# Patient Record
Sex: MALE | Race: WHITE | Employment: FULL TIME | URBAN - NONMETROPOLITAN AREA
[De-identification: names, ages, dates, MRNs, and addresses within clinical notes are randomized per-mention and may not be internally consistent; named-entity substitution may affect disease eponyms.]

---

## 2024-01-10 ENCOUNTER — OFFICE VISIT (OUTPATIENT)
Dept: PHYSICAL MEDICINE AND REHAB | Age: 39
End: 2024-01-10
Payer: COMMERCIAL

## 2024-01-10 VITALS
SYSTOLIC BLOOD PRESSURE: 134 MMHG | DIASTOLIC BLOOD PRESSURE: 76 MMHG | WEIGHT: 315 LBS | HEIGHT: 77 IN | BODY MASS INDEX: 37.19 KG/M2 | HEART RATE: 64 BPM

## 2024-01-10 DIAGNOSIS — G62.9 NEUROPATHY: ICD-10-CM

## 2024-01-10 DIAGNOSIS — M46.1 SI (SACROILIAC) JOINT INFLAMMATION (HCC): ICD-10-CM

## 2024-01-10 DIAGNOSIS — M54.81 OCCIPITAL NEURALGIA OF RIGHT SIDE: ICD-10-CM

## 2024-01-10 DIAGNOSIS — M47.816 LUMBAR SPONDYLOSIS: ICD-10-CM

## 2024-01-10 DIAGNOSIS — M79.7 FIBROMYALGIA: ICD-10-CM

## 2024-01-10 DIAGNOSIS — G54.0 THORACIC OUTLET SYNDROME: ICD-10-CM

## 2024-01-10 DIAGNOSIS — M79.18 MYOFASCIAL PAIN SYNDROME: ICD-10-CM

## 2024-01-10 DIAGNOSIS — G89.4 CHRONIC PAIN SYNDROME: ICD-10-CM

## 2024-01-10 DIAGNOSIS — M47.812 CERVICAL SPONDYLOSIS: Primary | ICD-10-CM

## 2024-01-10 PROCEDURE — 99244 OFF/OP CNSLTJ NEW/EST MOD 40: CPT | Performed by: NURSE PRACTITIONER

## 2024-01-10 RX ORDER — LISINOPRIL AND HYDROCHLOROTHIAZIDE 20; 12.5 MG/1; MG/1
1 TABLET ORAL DAILY
COMMUNITY

## 2024-01-10 RX ORDER — TRAZODONE HYDROCHLORIDE 150 MG/1
150 TABLET ORAL NIGHTLY
COMMUNITY

## 2024-01-10 RX ORDER — ALLOPURINOL 100 MG/1
100 TABLET ORAL DAILY
COMMUNITY

## 2024-01-10 RX ORDER — HYDROXYZINE PAMOATE 50 MG/1
50 CAPSULE ORAL 3 TIMES DAILY PRN
COMMUNITY
End: 2024-01-10

## 2024-01-10 RX ORDER — PREGABALIN 50 MG/1
50 CAPSULE ORAL 2 TIMES DAILY
Qty: 180 CAPSULE | Refills: 0 | Status: SHIPPED | OUTPATIENT
Start: 2024-01-10 | End: 2024-04-09

## 2024-01-10 RX ORDER — GABAPENTIN 600 MG/1
600 TABLET ORAL 3 TIMES DAILY
COMMUNITY

## 2024-01-10 ASSESSMENT — ENCOUNTER SYMPTOMS: BACK PAIN: 1

## 2024-01-10 NOTE — PATIENT INSTRUCTIONS
And more than one nerve may be treated.  How long do medial branch block and neurotomy take?  It takes 20 to 30 minutes to get the block. You can go home after the doctor watches you for about an hour.  It takes 45 to 90 minutes to get a neurotomy, depending on how many nerves are heated. You will probably go home 30 to 60 minutes after the procedure.  What can you expect after a neurotomy?  You will get instructions on how to report how much pain you have when you are at home.  You may feel a little sore or tender at the injection site at first. But after a successful neurotomy, most people have pain relief right away. It often lasts for several months, but your pain may come back.  If your pain does come back, it may mean that the damaged nerve has healed and can send pain messages again. Or it can mean that a different nerve is causing pain. Your doctor will discuss your options with you.  Follow-up care is a key part of your treatment and safety. Be sure to make and go to all appointments, and call your doctor if you are having problems. It's also a good idea to know your test results and keep a list of the medicines you take.  Current as of: July 11, 2023               Content Version: 13.9  © 2006-2023 [employer]   Care instructions adapted under license by [employer]. If you have questions about a medical condition or this instruction, always ask your healthcare professional. [employer] disclaims any warranty or liability for your use of this information.       Multidisciplinary Pain Management:   In the presence of complex, chronic, and multi-factorial pain, the importance of a multidisciplinary approach to pain management in the patient’s management regimen was emphasized and discussed in great detail.   PHYSICAL THERAPY: Patient is advised to see a physical therapist for gentle stretching exercises and conditioning exercises for management of pain.   PSYCHOLOGY: Patient is

## 2024-01-10 NOTE — PROGRESS NOTES
[facility]  [facility]  [address]  [place] [address]  Dept: [phone]  Dept Fax: [phone]  Loc: [phone]    Visit Date: 1/10/2024    Wyatt Gallardo is a 38 y.o. male who is referred for pain management evaluation and treatment per Dr. Jenkins.                CAGE and CAGE-AID Questions   1. In the last three months, have you felt you should cut down or stop drinking or using drugs?  Yes []        No [x]     2. In the last three months, has anyone annoyed you or gotten on your nerves by telling you to cut down or stop drinking or using drugs?  Yes []        No [x]     3. In the last three months, have you felt guilty or bad about how much you drink or use drugs?   Yes []        No [x]     4. In the last three months, have you been waking up wanting to have an alcoholic drink or use drugs?  Yes []        No [x]        Opioid Risk Tool:  Clinician Form       1. Family History of Substance Abuse: Female Male    Alcohol   []1   []3    Illegal drugs   []2   []3    Prescription drugs     []4   []4   2. Personal History of Substance Abuse:          Alcohol   []3   []3    Illegal drugs   []4   []4    Prescription drugs     []5   []5   3. Age (silas box if between 16 and 45):     []1   []1   4. History of Preadolescent Sexual Abuse:     []3   []0   5. Psychological Disease:      Attention deficit disorder, obsessive-compulsive disorder, bipolar, schizophrenia   []2   []2      Depression     []1   [x]1    Scoring Totals  1     Total Score  Low Risk  Moderate Risk  High Risk   Risk Category   0 - 3   4 - 7   8 or Above      Patient states symptoms interfere with:  A.  General Activity:  yes   B.  Mood: yes    C.  Walking Ability:   yes   D.  Normal Work (Includes both work outside the home and housework):   yes    E.  Relations with Other People:  yes   F.  Sleep:   yes   G. Enjoyment of Life:  yes       
Functionality Assessment/Goals Worksheet     On a scale of 0 (Does not Interfere) to 10 (Completely Interferes)     1.  Which number describes how during the past week pain has interfered with           the following:  A.  General Activity:  6  B.  Mood: 7  C.  Walking Ability:  6  D.  Normal Work (Includes both work outside the home and housework):  6  E.  Relations with Other People:   7  F.  Sleep:   4  G.  Enjoyment of Life:   7    2.  Patient Prefers to Take their Pain Medications:     []  On a regular basis   []  Only when necessary    []  Does not take pain medications    3.  What are the Patient's Goals/Expectations for Visiting Pain Management?     [x]  Learn about my pain    [x]  Receive Medication   [x]  Physical Therapy     [x]  Treat Depression   [x]  Receive Injections    [x]  Treat Sleep   [x]  Deal with Anxiety and Stress   []  Treat Opoid Dependence/Addiction   []  Other:                                
posteriorly relative to the glenoid.     Prior Injections:  LESI L3  Right OCNB  BELKIS  C7  Shoulder injections  TPI   BOTOX for migraines  The patient is allergic to pcn [penicillins].       Subjective:      Review of Systems   Constitutional:  Positive for activity change.   Respiratory:          YULISSA   Cardiovascular:         HTN   Gastrointestinal:         Umbilical hernia IBS Gastritis  changed to Gluten free diet  Vegan    Endocrine:        DM  2    Musculoskeletal:  Positive for arthralgias, back pain, myalgias, neck pain and neck stiffness.        GOUT   Neurological:  Positive for headaches.   Psychiatric/Behavioral:          Follows Psych for panic anxiety depression        Objective:     Vitals:    01/10/24 0938   BP: 134/76   Site: Left Upper Arm   Position: Sitting   Cuff Size: Large Adult   Pulse: 64   Weight: (!) 153.3 kg (338 lb)   Height: 1.956 m (6' 5\")       Physical Exam  Vitals and nursing note reviewed.   Constitutional:       Appearance: He is obese.   HENT:      Head: Normocephalic and atraumatic.      Right Ear: External ear normal.      Left Ear: External ear normal.      Nose: Nose normal.   Eyes:      Extraocular Movements: Extraocular movements intact.      Conjunctiva/sclera: Conjunctivae normal.      Pupils: Pupils are equal, round, and reactive to light.   Pulmonary:      Effort: Pulmonary effort is normal. No respiratory distress.   Musculoskeletal:         General: Tenderness present.      Right shoulder: Tenderness, bony tenderness and crepitus present. Decreased range of motion. Decreased strength.      Left shoulder: Tenderness and bony tenderness present. Decreased range of motion.      Cervical back: Spasms, tenderness and bony tenderness present. Decreased range of motion.      Thoracic back: Spasms, tenderness and bony tenderness present. Decreased range of motion.      Lumbar back: Spasms, tenderness and bony tenderness present. Decreased range of motion.        Back:

## 2024-01-11 ENCOUNTER — TELEPHONE (OUTPATIENT)
Dept: PHYSICAL MEDICINE AND REHAB | Age: 39
End: 2024-01-11

## 2024-01-16 ENCOUNTER — TELEPHONE (OUTPATIENT)
Dept: PHYSICAL MEDICINE AND REHAB | Age: 39
End: 2024-01-16

## 2024-01-17 NOTE — DISCHARGE INSTRUCTIONS
Signature                                                         Date/Time                                                                                                                                            Physician's or R.N.'s Signature                                                                  Date/Time      The discharge instructions have been reviewed with the patient and/or Guardian.  Patient and/or Guardian signed and retained a printed copy.

## 2024-01-25 ENCOUNTER — HOSPITAL ENCOUNTER (OUTPATIENT)
Age: 39
Setting detail: OUTPATIENT SURGERY
Discharge: HOME | End: 2024-01-25
Attending: PAIN MEDICINE | Admitting: PAIN MEDICINE
Payer: COMMERCIAL

## 2024-01-25 ENCOUNTER — APPOINTMENT (OUTPATIENT)
Dept: GENERAL RADIOLOGY | Age: 39
End: 2024-01-25
Attending: PAIN MEDICINE
Payer: COMMERCIAL

## 2024-01-25 VITALS
HEIGHT: 77 IN | TEMPERATURE: 98 F | OXYGEN SATURATION: 94 % | SYSTOLIC BLOOD PRESSURE: 135 MMHG | BODY MASS INDEX: 37.19 KG/M2 | RESPIRATION RATE: 16 BRPM | HEART RATE: 71 BPM | DIASTOLIC BLOOD PRESSURE: 80 MMHG | WEIGHT: 315 LBS

## 2024-01-25 PROBLEM — M47.812 CERVICAL SPONDYLOSIS: Status: ACTIVE | Noted: 2024-01-25

## 2024-01-25 PROCEDURE — 2500000003 HC RX 250 WO HCPCS: Performed by: PAIN MEDICINE

## 2024-01-25 PROCEDURE — 3600000056 HC PAIN LEVEL 4 BASE: Performed by: PAIN MEDICINE

## 2024-01-25 PROCEDURE — 7100000010 HC PHASE II RECOVERY - FIRST 15 MIN: Performed by: PAIN MEDICINE

## 2024-01-25 PROCEDURE — 6360000002 HC RX W HCPCS: Performed by: PAIN MEDICINE

## 2024-01-25 PROCEDURE — 64491 INJ PARAVERT F JNT C/T 2 LEV: CPT | Performed by: PAIN MEDICINE

## 2024-01-25 PROCEDURE — 3600000057 HC PAIN LEVEL 4 ADDL 15 MIN: Performed by: PAIN MEDICINE

## 2024-01-25 PROCEDURE — 2709999900 HC NON-CHARGEABLE SUPPLY: Performed by: PAIN MEDICINE

## 2024-01-25 PROCEDURE — 64490 INJ PARAVERT F JNT C/T 1 LEV: CPT | Performed by: PAIN MEDICINE

## 2024-01-25 RX ORDER — LIDOCAINE HYDROCHLORIDE 20 MG/ML
INJECTION, SOLUTION INFILTRATION; PERINEURAL PRN
Status: DISCONTINUED | OUTPATIENT
Start: 2024-01-25 | End: 2024-01-25 | Stop reason: ALTCHOICE

## 2024-01-25 RX ORDER — BUPIVACAINE HYDROCHLORIDE 5 MG/ML
INJECTION, SOLUTION PERINEURAL PRN
Status: DISCONTINUED | OUTPATIENT
Start: 2024-01-25 | End: 2024-01-25 | Stop reason: ALTCHOICE

## 2024-01-25 ASSESSMENT — PAIN - FUNCTIONAL ASSESSMENT
PAIN_FUNCTIONAL_ASSESSMENT: 0-10
PAIN_FUNCTIONAL_ASSESSMENT: NONE - DENIES PAIN

## 2024-01-25 NOTE — PROGRESS NOTES
1134: Patient to phase 2 recovery room via cart. Patient is awake and alert. Report received from surgical RN, Imani. Patient's vitals obtained, see charting.   1136: Patient is denying pain and nausea at this time.   1137: Offered drink given. Side rail down for patient to get dressed. Patient stating he understood his discharge instructions given in pre op.   1141: Patient dressed and ready to leave. Patient ambulated to the discharge lobby and discharged home in stable condition with his family.

## 2024-01-25 NOTE — OP NOTE
Then a #22-gauge 3-1/2   inch spinal needle was introduced through the skin wheal under fluoroscopy guidance such that the tip of the needle lies at the junction of the transverse process C4/5 and C5/6  with the superior processes of the facet joint.   Then after negative aspiration a total of 6 ml of 0.5% Marcaine and was injected through the needle in divided doses   EBL-0  Patient's vital signs and neurological status remained stable through  the procedure and the post procedural  period.  The patient was instructed to keep track of pain for next 24 hours every hour and bring it to the next visit.  Patient tolerated the procedure well and was discharged home in stable condition.    Electronically signed by Rajan Leonard MD on 1/25/24 at 11:21 AM EST

## 2024-01-25 NOTE — H&P
present. Decreased range of motion.      Left hip: Tenderness, bony tenderness and crepitus present. Decreased range of motion.      Right knee: Bony tenderness present. Tenderness present.      Left knee: Bony tenderness and crepitus present. Decreased range of motion. Tenderness present.   Skin:     General: Skin is warm and dry.   Neurological:      General: No focal deficit present.      Mental Status: He is alert and oriented to person, place, and time.   Psychiatric:         Mood and Affect: Mood normal.         Behavior: Behavior normal.         Thought Content: Thought content normal.         Judgment: Judgment normal.         FRANK  Patricks test  positive  Yeoman's or Gaenslen's  positive  Kemps  positive  Spurlings  positive           Assessment:      1. Cervical spondylosis    2. SI (sacroiliac) joint inflammation (HCC)    3. Lumbar spondylosis    4. Myofascial pain syndrome    5. Chronic pain syndrome    6. Occipital neuralgia of right side    7. Fibromyalgia    8. Thoracic outlet syndrome    9. Neuropathy             ..There is no problem list on file for this patient.     Plan:      Testing, Labs or Radiology Reviewed: Cervical shoulder Thoracic  Procedures with Fluoroscopy: Cervical Facet MBB #1 @ C4-5,5-6 Bilateral   Discussed with patient about risks with procedure including infection, reaction to medication, increased pain, or bleeding.  Medications:I will take over Trazodone but lower to 25 mg . Continue Cymbalta 60   Lower Neurontin to 600 mg at HS only at 8 pm   Trial Linnette 50 mg BID 6-8 am then 2-4 pm.  STOP HYDROXYZINE   If patient is on blood thinners will need approval to hold: yes or no:  Does patient have implanted device Stimulator, AICD or Pacemaker etc?   Referral to Dr Luke to rule out Thoracic Outlet Syndrome               Return for Cervical Facet MBB @C4-5, 5-6 Bilateral #1, Follow up after procedure.

## 2024-02-13 ENCOUNTER — OFFICE VISIT (OUTPATIENT)
Dept: PHYSICAL MEDICINE AND REHAB | Age: 39
End: 2024-02-13
Payer: COMMERCIAL

## 2024-02-13 VITALS
BODY MASS INDEX: 37.19 KG/M2 | HEIGHT: 77 IN | DIASTOLIC BLOOD PRESSURE: 78 MMHG | HEART RATE: 66 BPM | WEIGHT: 315 LBS | SYSTOLIC BLOOD PRESSURE: 138 MMHG

## 2024-02-13 DIAGNOSIS — M25.551 BILATERAL HIP PAIN: ICD-10-CM

## 2024-02-13 DIAGNOSIS — M54.81 OCCIPITAL NEURALGIA OF RIGHT SIDE: ICD-10-CM

## 2024-02-13 DIAGNOSIS — M79.7 FIBROMYALGIA: ICD-10-CM

## 2024-02-13 DIAGNOSIS — M79.18 MYOFASCIAL PAIN SYNDROME: ICD-10-CM

## 2024-02-13 DIAGNOSIS — M47.812 CERVICAL SPONDYLOSIS: Primary | ICD-10-CM

## 2024-02-13 DIAGNOSIS — M47.816 LUMBAR SPONDYLOSIS: ICD-10-CM

## 2024-02-13 DIAGNOSIS — M25.552 BILATERAL HIP PAIN: ICD-10-CM

## 2024-02-13 DIAGNOSIS — G89.4 CHRONIC PAIN SYNDROME: ICD-10-CM

## 2024-02-13 DIAGNOSIS — M46.1 SI (SACROILIAC) JOINT INFLAMMATION (HCC): ICD-10-CM

## 2024-02-13 DIAGNOSIS — G62.9 NEUROPATHY: ICD-10-CM

## 2024-02-13 PROCEDURE — 99214 OFFICE O/P EST MOD 30 MIN: CPT | Performed by: NURSE PRACTITIONER

## 2024-02-13 PROCEDURE — G8484 FLU IMMUNIZE NO ADMIN: HCPCS | Performed by: NURSE PRACTITIONER

## 2024-02-13 PROCEDURE — 1036F TOBACCO NON-USER: CPT | Performed by: NURSE PRACTITIONER

## 2024-02-13 PROCEDURE — 20553 NJX 1/MLT TRIGGER POINTS 3/>: CPT | Performed by: NURSE PRACTITIONER

## 2024-02-13 PROCEDURE — G8417 CALC BMI ABV UP PARAM F/U: HCPCS | Performed by: NURSE PRACTITIONER

## 2024-02-13 PROCEDURE — G8427 DOCREV CUR MEDS BY ELIG CLIN: HCPCS | Performed by: NURSE PRACTITIONER

## 2024-02-13 RX ORDER — TRIAMCINOLONE ACETONIDE 40 MG/ML
80 INJECTION, SUSPENSION INTRA-ARTICULAR; INTRAMUSCULAR ONCE
Status: DISCONTINUED | OUTPATIENT
Start: 2024-02-13 | End: 2024-02-13

## 2024-02-13 RX ORDER — TRIAMCINOLONE ACETONIDE 40 MG/ML
40 INJECTION, SUSPENSION INTRA-ARTICULAR; INTRAMUSCULAR ONCE
Status: COMPLETED | OUTPATIENT
Start: 2024-02-13 | End: 2024-02-13

## 2024-02-13 RX ADMIN — TRIAMCINOLONE ACETONIDE 40 MG: 40 INJECTION, SUSPENSION INTRA-ARTICULAR; INTRAMUSCULAR at 13:39

## 2024-02-13 NOTE — PROGRESS NOTES
HPI:     ChiefComplaint: Lumbar back pain, cervical pain , SI pain, and Fibromyalgia Myofascial       F/U  Cervical Facet MBB #1 @ C4-5,5-6 Bilateral  1/25/2024  states he received about 80% pain relief or benefit for 2-3 days. He had less headaches more ROM less pain more feeling in his cervical incision which has been numb since surgery. It was more hypersensitive .   Here with mother. The cervical pain and headaches have returned.   He is having some right sided mostly  thoracic pain  under scapular area mostly.   He has been having increase hip pain  mostly when goes to stand. He has been having Fibromyalgia flares also. He continues water therapy. States helps him the most.   Lyrica trial is helping his abdomen or torso nerve pain.   He feels his Fibromyalgia nerve pain is his main problem form hips waste down to thighs. Sometimes he can't walk.  Stress increases his Fibromyalgia.  He has made multiple lifestyle dietary changes     He is pending  disability claim appeals.      Follows Psych  for  MDD seen 1/17/2024 he has been having increased anxiety and brain fog.     I referred to Dr Luke to rule out Thoracic Outlet Syndrome see results below   We will order more PT OT              HPI  New pt here with mom, transferring care from [facility] due to lapse in insurance and then tired of driving  that far. He was going to  [facility] for past 3 years. He wasn't very happy with  the treatment plan.  He has had several injections some with relief some without. He most recently stopped his Neurontin due to Dr not refilling and states he felt more clearer minded  not on the medication but was in a  lot more pain. Then was refilled and now taking.   He currently takes Neurontin  Cymbalta Diclofenac Trazodone Hydroxyzine. He feels like he is weak and tired all the time. With Brain fogTylenol Medical THC for past 2 years.  He has MAXALT in past has used 3-4 times states works but

## 2024-02-13 NOTE — PATIENT INSTRUCTIONS
It's also a good idea to know your test results and keep a list of the medicines you take.  Current as of: July 11, 2023               Content Version: 13.9  © 2006-2023 [employer].   Care instructions adapted under license by [employer]. If you have questions about a medical condition or this instruction, always ask your healthcare professional. [employer] disclaims any warranty or liability for your use of this information.       Multidisciplinary Pain Management:   In the presence of complex, chronic, and multi-factorial pain, the importance of a multidisciplinary approach to pain management in the patient’s management regimen was emphasized and discussed in great detail.   PHYSICAL THERAPY: Patient is advised to see a physical therapist for gentle stretching exercises and conditioning exercises for management of pain.   PSYCHOLOGY: Patient is advised to see a clinical pain psychologist, for the psychosocial aspect of pain care through coping skills, relaxation strategies, cognitive group therapy etc.   OBESITY: Patient is advised to seek nutrition consult to help in managing their weight to decrease its impact on pain.   The patient was also advised to continue physical therapy and stretching exercises at home and cognitive behavioral and/or group therapy.

## 2024-02-13 NOTE — PROGRESS NOTES
Functionality Assessment/Goals Worksheet     On a scale of 0 (Does not Interfere) to 10 (Completely Interferes)     1.  Which number describes how during the past week pain has interfered with           the following:  A.  General Activity:  5  B.  Mood: 8  C.  Walking Ability:  0  D.  Normal Work (Includes both work outside the home and housework):  7  E.  Relations with Other People:   8  F.  Sleep:   8  G.  Enjoyment of Life:   8    2.  Patient Prefers to Take their Pain Medications:     [x]  On a regular basis   []  Only when necessary    []  Does not take pain medications    3.  What are the Patient's Goals/Expectations for Visiting Pain Management?     [x]  Learn about my pain    [x]  Receive Medication   [x]  Physical Therapy     [x]  Treat Depression   [x]  Receive Injections    [x]  Treat Sleep   [x]  Deal with Anxiety and Stress   []  Treat Opoid Dependence/Addiction   []  Other:

## 2024-02-14 ENCOUNTER — TELEPHONE (OUTPATIENT)
Dept: PHYSICAL MEDICINE AND REHAB | Age: 39
End: 2024-02-14

## 2024-02-23 ENCOUNTER — HOSPITAL ENCOUNTER (OUTPATIENT)
Dept: GENERAL RADIOLOGY | Age: 39
Discharge: HOME | End: 2024-02-23
Payer: COMMERCIAL

## 2024-02-23 ENCOUNTER — HOSPITAL ENCOUNTER (OUTPATIENT)
Age: 39
Discharge: HOME | End: 2024-02-23
Payer: COMMERCIAL

## 2024-02-23 DIAGNOSIS — M25.551 BILATERAL HIP PAIN: ICD-10-CM

## 2024-02-23 DIAGNOSIS — M46.1 SI (SACROILIAC) JOINT INFLAMMATION (HCC): ICD-10-CM

## 2024-02-23 DIAGNOSIS — M25.552 BILATERAL HIP PAIN: ICD-10-CM

## 2024-02-23 PROCEDURE — 73522 X-RAY EXAM HIPS BI 3-4 VIEWS: CPT

## 2024-03-01 NOTE — DISCHARGE INSTRUCTIONS
ANESTHESIA INSTRUCTIONS FOLLOWING SURGERY        Since you may experience some intermittent light-headedness for the next several hours, we suggest you plan on bed rest or quiet relaxation this evening. You must have a friend or relative stay with you tonight.    Because of the sedation you have received, it is recommended that you do not drive a motor vehicle, operate any kind of machinery, or sign any contractual agreement for 24 hours following the procedure.    You should not take alcoholic beverages tonight and only take sleeping medication that has been specifically prescribed for you by your physician.  Call office [phone] if you have:  Temperature greater than 100.4  Persistent nausea and vomiting  Severe uncontrolled pain  Redness, tenderness, or signs of infection (pain, swelling, redness, odor or green/yellow discharge around the site)  Difficulty breathing, headache or visual disturbances  Hives  Persistent dizziness or light-headedness  Extreme fatigue  Any other questions or concerns you may have after discharge    In an emergency, call 911 or go to an Emergency Department at a nearby hospital    It is important to bring a complete, current list of your medications to any medical appointments or hospitalizations.    REMINDER:   Carry a list of your medications and allergies with you at all times  Call your pharmacy at least 1 week in advance to refill prescriptions    Diet: Resume your usual diet. Good nutrition promotes healing. Increase fluid intake.     Activity: Rest for 24 hrs then resume normal activity.    HOME MEDICATIONS:      If on blood thinning products such as; Aspirin, NSAIDS, Plavix, Coumadin, Xarelto, Fish Oil, Multi-Vitamins or Herbal Supplements restart in 24 hours      Restart Metformin in 48 hours if you had procedure with dye.      Restart Metformin in 24 hours if no dye used during procedure.        Education Materials Received: {yes/no:014369}  Belongings Returned:

## 2024-03-06 ENCOUNTER — HOSPITAL ENCOUNTER (OUTPATIENT)
Age: 39
Setting detail: OUTPATIENT SURGERY
Discharge: HOME | End: 2024-03-06
Attending: PAIN MEDICINE | Admitting: PAIN MEDICINE
Payer: COMMERCIAL

## 2024-03-06 ENCOUNTER — APPOINTMENT (OUTPATIENT)
Dept: GENERAL RADIOLOGY | Age: 39
End: 2024-03-06
Attending: PAIN MEDICINE
Payer: COMMERCIAL

## 2024-03-06 VITALS
OXYGEN SATURATION: 98 % | WEIGHT: 315 LBS | BODY MASS INDEX: 37.19 KG/M2 | RESPIRATION RATE: 16 BRPM | SYSTOLIC BLOOD PRESSURE: 139 MMHG | TEMPERATURE: 97.1 F | HEART RATE: 65 BPM | HEIGHT: 77 IN | DIASTOLIC BLOOD PRESSURE: 89 MMHG

## 2024-03-06 PROCEDURE — 64491 INJ PARAVERT F JNT C/T 2 LEV: CPT | Performed by: PAIN MEDICINE

## 2024-03-06 PROCEDURE — 3600000057 HC PAIN LEVEL 4 ADDL 15 MIN: Performed by: PAIN MEDICINE

## 2024-03-06 PROCEDURE — 2500000003 HC RX 250 WO HCPCS: Performed by: PAIN MEDICINE

## 2024-03-06 PROCEDURE — 3600000056 HC PAIN LEVEL 4 BASE: Performed by: PAIN MEDICINE

## 2024-03-06 PROCEDURE — 7100000010 HC PHASE II RECOVERY - FIRST 15 MIN: Performed by: PAIN MEDICINE

## 2024-03-06 PROCEDURE — 99152 MOD SED SAME PHYS/QHP 5/>YRS: CPT | Performed by: PAIN MEDICINE

## 2024-03-06 PROCEDURE — 64490 INJ PARAVERT F JNT C/T 1 LEV: CPT | Performed by: PAIN MEDICINE

## 2024-03-06 PROCEDURE — 6360000002 HC RX W HCPCS: Performed by: PAIN MEDICINE

## 2024-03-06 PROCEDURE — 2709999900 HC NON-CHARGEABLE SUPPLY: Performed by: PAIN MEDICINE

## 2024-03-06 RX ORDER — PREDNISONE 1 MG/1
TABLET ORAL DAILY
COMMUNITY

## 2024-03-06 RX ORDER — BUPIVACAINE HYDROCHLORIDE 5 MG/ML
INJECTION, SOLUTION PERINEURAL PRN
Status: DISCONTINUED | OUTPATIENT
Start: 2024-03-06 | End: 2024-03-06 | Stop reason: ALTCHOICE

## 2024-03-06 RX ORDER — LIDOCAINE HYDROCHLORIDE 20 MG/ML
INJECTION, SOLUTION INFILTRATION; PERINEURAL PRN
Status: DISCONTINUED | OUTPATIENT
Start: 2024-03-06 | End: 2024-03-06 | Stop reason: ALTCHOICE

## 2024-03-06 ASSESSMENT — PAIN - FUNCTIONAL ASSESSMENT
PAIN_FUNCTIONAL_ASSESSMENT: 0-10
PAIN_FUNCTIONAL_ASSESSMENT: 0-10

## 2024-03-06 NOTE — PRE SEDATION
[facility]  Pre-Sedation/Analgesia History & Physical    Pt Name: Wyatt Gallardo  MRN: [id number]  YOB: 1985  Provider Performing Procedure: Rajan Leonard MD  Primary Care Physician: Eran Jenkins MD    PRE-PROCEDURE   DNR-CCA/DNR-CC : No  Brief History/Pre-Procedure Diagnosis:     Patient had  Cervical Facet MBB #1 @ C4-5,5-6 Bilateral  1/25/2024  states he received  80% pain relief or benefit for 2-3 days. He had less headaches more ROM less pain more feeling in his cervical incision which has been numb since surgery. It was more hypersensitive .   Here with mother. The cervical pain and headaches have returned.   He is having some right sided mostly  thoracic pain  under scapular area mostly.   He has been having increase hip pain  mostly when goes to stand. He has been having Fibromyalgia flares also. He continues water therapy. States helps him the most.   Lyrica trial is helping his abdomen or torso nerve pain.   He feels his Fibromyalgia nerve pain is his main problem form hips waste down to thighs. Sometimes he can't walk.  Stress increases his Fibromyalgia.  He has made multiple lifestyle dietary changes      He is pending  disability claim appeals.       Follows Psych  for  MDD seen 1/17/2024 he has been having increased anxiety and brain fog.      I referred to Dr Luke to rule out Thoracic Outlet Syndrome see results below   We will order more PT OT                  HPI  New pt here with mom, transferring care from [facility] due to lapse in insurance and then tired of driving  that far. He was going to  [facility] for past 3 years. He wasn't very happy with  the treatment plan.  He has had several injections some with relief some without. He most recently stopped his Neurontin due to Dr not refilling and states he felt more clearer minded  not on the medication but was in a  lot more pain. Then was refilled and now taking.

## 2024-03-06 NOTE — OP NOTE
Pre-Procedure Note    Patient Name: Wyatt Gallardo   YOB: 1985  Medical Record Number: [id number]  Date: 3/6/24    Indication:  Neck pain    Consent: On file.    Vital Signs:   Vitals:    03/06/24 1111   BP: 134/88   Pulse: 65   Resp: 16   Temp: 97.4 °F (36.3 °C)   SpO2: 97%       Past Medical History:   has a past medical history of Diabetes mellitus (HCC), Hypertension, and Insomnia.    Past Surgical History:   has a past surgical history that includes FL SHOULDER ARTHROGRAM RIGHT S&I (Left, 2005); Carpal tunnel release (Bilateral, 2020); Cervical disc surgery (2021); Tonsillectomy and adenoidectomy (1988); and Facet joint injection (Bilateral, 1/25/2024).    Pre-Sedation Documentation and Exam:   Vital signs have been reviewed (see flow sheet for vitals).     Sedation/ Anesthesia Plan:   LOCAL      Patient is an appropriate candidate for plan of sedation: yes      Preoperative Diagnosis: C-spondylosis     Post-Op Dx: as above     Procedure Performed : Diagnostic / Confirmatory Median Branch Blocks at the levels of C4-5 and C5-6 bilateral under fluoroscopic guidance without IV sedation. # 1.     Indication for the Procedure:  Patient had history of chronic neck pain that is not responding well to the conservative treatment.  Patient's pain is mostly axial in nature.  Pain is interfering with the activities of daily living.  Examination revealed facet tenderness and facet loading is positive.   Hence we decided to do confirmatory median branch blocks for possible radiofrequency abalation of median branches for long term pain releif.   The procedure and risks  were discussed with the patient and an informed consent was obtained.     Procedure:    Patient is placed in prone position and skin over the back was prepped and draped in sterile manner. Then using fluoroscopy the waist of the facet joint complex of the vertebra was observed and the view  was optimized. The skin and deep tissues over the area

## 2024-03-06 NOTE — H&P
reviewed.   Constitutional:       Appearance: He is obese.   HENT:      Head: Normocephalic and atraumatic.      Right Ear: External ear normal.      Left Ear: External ear normal.      Nose: Nose normal.   Eyes:      Extraocular Movements: Extraocular movements intact.      Conjunctiva/sclera: Conjunctivae normal.      Pupils: Pupils are equal, round, and reactive to light.   Pulmonary:      Effort: Pulmonary effort is normal. No respiratory distress.   Musculoskeletal:         General: Tenderness present.      Right shoulder: Tenderness, bony tenderness and crepitus present. Decreased range of motion. Decreased strength.      Left shoulder: Tenderness and bony tenderness present. Decreased range of motion.      Cervical back: Spasms, tenderness and bony tenderness present. Decreased range of motion.      Thoracic back: Spasms, tenderness and bony tenderness present. Decreased range of motion.      Lumbar back: Spasms, tenderness and bony tenderness present. Decreased range of motion.        Back:       Right hip: Tenderness, bony tenderness and crepitus present. Decreased range of motion.      Left hip: Tenderness, bony tenderness and crepitus present. Decreased range of motion.      Right knee: Bony tenderness present. Tenderness present.      Left knee: Bony tenderness and crepitus present. Decreased range of motion. Tenderness present.   Skin:     General: Skin is warm and dry.   Neurological:      General: No focal deficit present.      Mental Status: He is alert and oriented to person, place, and time.   Psychiatric:         Mood and Affect: Mood normal.         Behavior: Behavior normal.         Thought Content: Thought content normal.         Judgment: Judgment normal.         FRANK  Patricks test  positive  Yeoman's or Gaenslen's  positive  Kemps  positive  Spurlings  positive           Assessment:      1. Cervical spondylosis    2. Lumbar spondylosis    3. Fibromyalgia    4. SI (sacroiliac) joint

## 2024-03-06 NOTE — PROGRESS NOTES
1217 Patient to phase 2 from surgery, report from Linda VARELA. Vitals stable on room air. Injection site with no complications. Patient alert, oriented, appropriate. Patient states pain is improved from when he came in. Call light in reach.  1220 Patient getting dressed.   1230 Patient walked to discharge doors in stable condition. Discharged with AVS and all belongings.

## 2024-03-20 ENCOUNTER — TELEPHONE (OUTPATIENT)
Dept: PHYSICAL MEDICINE AND REHAB | Age: 39
End: 2024-03-20

## 2024-03-20 ENCOUNTER — OFFICE VISIT (OUTPATIENT)
Dept: PHYSICAL MEDICINE AND REHAB | Age: 39
End: 2024-03-20
Payer: COMMERCIAL

## 2024-03-20 VITALS
SYSTOLIC BLOOD PRESSURE: 136 MMHG | BODY MASS INDEX: 37.19 KG/M2 | HEART RATE: 76 BPM | DIASTOLIC BLOOD PRESSURE: 82 MMHG | HEIGHT: 77 IN | WEIGHT: 315 LBS

## 2024-03-20 DIAGNOSIS — M46.1 SI (SACROILIAC) JOINT INFLAMMATION (HCC): ICD-10-CM

## 2024-03-20 DIAGNOSIS — G89.4 CHRONIC PAIN SYNDROME: ICD-10-CM

## 2024-03-20 DIAGNOSIS — M47.812 CERVICAL SPONDYLOSIS: Primary | ICD-10-CM

## 2024-03-20 DIAGNOSIS — M47.816 LUMBAR SPONDYLOSIS: ICD-10-CM

## 2024-03-20 DIAGNOSIS — G62.9 NEUROPATHY: ICD-10-CM

## 2024-03-20 DIAGNOSIS — M79.7 FIBROMYALGIA: ICD-10-CM

## 2024-03-20 DIAGNOSIS — M79.18 MYOFASCIAL PAIN SYNDROME: ICD-10-CM

## 2024-03-20 PROCEDURE — G8484 FLU IMMUNIZE NO ADMIN: HCPCS | Performed by: NURSE PRACTITIONER

## 2024-03-20 PROCEDURE — 99215 OFFICE O/P EST HI 40 MIN: CPT | Performed by: NURSE PRACTITIONER

## 2024-03-20 PROCEDURE — 1036F TOBACCO NON-USER: CPT | Performed by: NURSE PRACTITIONER

## 2024-03-20 PROCEDURE — G8417 CALC BMI ABV UP PARAM F/U: HCPCS | Performed by: NURSE PRACTITIONER

## 2024-03-20 PROCEDURE — G8427 DOCREV CUR MEDS BY ELIG CLIN: HCPCS | Performed by: NURSE PRACTITIONER

## 2024-03-20 RX ORDER — PREGABALIN 50 MG/1
50 CAPSULE ORAL 3 TIMES DAILY
Qty: 270 CAPSULE | Refills: 0 | Status: SHIPPED | OUTPATIENT
Start: 2024-03-20 | End: 2024-06-18

## 2024-03-20 ASSESSMENT — ENCOUNTER SYMPTOMS: BACK PAIN: 1

## 2024-03-20 NOTE — PROGRESS NOTES
Functionality Assessment/Goals Worksheet     On a scale of 0 (Does not Interfere) to 10 (Completely Interferes)     1.  Which number describes how during the past week pain has interfered with           the following:  A.  General Activity:  6  B.  Mood: 7  C.  Walking Ability:  0  D.  Normal Work (Includes both work outside the home and housework):  7  E.  Relations with Other People:   5  F.  Sleep:   8  G.  Enjoyment of Life:   5    2.  Patient Prefers to Take their Pain Medications:     []  On a regular basis   []  Only when necessary    []  Does not take pain medications    3.  What are the Patient's Goals/Expectations for Visiting Pain Management?     [x]  Learn about my pain    [x]  Receive Medication   []  Physical Therapy     [x]  Treat Depression   []  Receive Injections    [x]  Treat Sleep   [x]  Deal with Anxiety and Stress   []  Treat Opoid Dependence/Addiction   []  Other:                                
rule out Thoracic Outlet Syndrome   Reviewed Hip pelvis XR  ordered  FCE ordered pending  March 27,2024     Meds. Prescribed:   Orders Placed This Encounter   Medications    pregabalin (LYRICA) 50 MG capsule     Sig: Take 1 capsule by mouth 3 times daily for 90 days. Max Daily Amount: 150 mg     Dispense:  270 capsule     Refill:  0       Return for  Cervical  RFA @ C4-5,5-6 Bilateral with IV sedation under Fluorsoscopy .         Electronically signed by SHILPA Stovall CNP on 3/20/2024 at 9:00 AM

## 2024-03-20 NOTE — PATIENT INSTRUCTIONS
Patient Education        Learning About Medial Branch Block and Neurotomy  What are medial branch block and neurotomy?     Facet joints connect your vertebrae to each other. Problems in these joints can cause chronic (long-term) pain in the neck or back.  Medial branch nerves are the nerves that carry many of the pain messages from your facet joints.  Radiofrequency medial branch neurotomy is a type of medial branch neurotomy that is used to relieve arthritis pain. It uses radio waves to damage nerves in your neck or back so that they can no longer send pain messages to your brain.  Before your doctor knows if a neurotomy will help you, you will get a medial branch block to find out if certain nerves are the ones that are a source of your pain. You will need two separate visits to the outpatient center or hospital to have both procedures.  You will need someone to drive you home.  How is a medial branch block done?  The doctor will use a tiny needle to numb the skin where you will get the block. Then the doctor puts the block needle into the numbed area. You may feel some pressure, but you should not feel pain. Using fluoroscopy (live X-ray) to guide the needle, the doctor injects medicine onto one or more nerves to make them numb.  If you get relief from your pain in the next 4 to 6 hours, it's a sign that those nerves may be contributing to your pain. The relief will last only a short time. You may then have a medial branch neurotomy at a later visit to try to get longer relief.  How is a medial branch neurotomy done?  The doctor will use a tiny needle to numb the skin where you will get the neurotomy. Then the doctor puts the neurotomy needle into the numbed area. You may feel some pressure. Using fluoroscopy (live X-ray) to guide the needle, the doctor sends radio waves through the needle to the nerve for 60 to 90 seconds. The radio waves heat the nerve, which damages it. The doctor may do this several times.

## 2024-03-27 ENCOUNTER — HOSPITAL ENCOUNTER (OUTPATIENT)
Dept: PHYSICAL THERAPY | Age: 39
Setting detail: THERAPIES SERIES
Discharge: HOME | End: 2024-03-27
Payer: COMMERCIAL

## 2024-03-27 PROCEDURE — 97750 PHYSICAL PERFORMANCE TEST: CPT

## 2024-03-27 NOTE — DISCHARGE INSTRUCTIONS
surgery using electric clippers if the hair is in the same area where the procedure will occur. They should not shave you with a razor.  Give you antibiotics before your surgery starts. In most cases, you should get antibiotics within 60 minutes before the surgery starts and the antibiotics should be stopped within 24 hours after surgery.  Clean the skin at the site of your surgery with a special soap that kills germs.  Clean their hands and arms up to their elbows with an antiseptic agent just before the surgery.  Wear special hair covers, masks, gowns, and gloves during surgery to  keep the surgery area clean.  Clean their hands with soap and water or an alcohol-based hand rub before and after caring for each patient.             If you do not see your providers clean their hands, please     ask  them to do so.     What can I do to help prevent SSIs?  Before your surgery:  Tell your doctor about other medical problems you may have.  Health problems such as allergies, diabetes, and obesity could affect your surgery and your treatment.   Quit smoking. Patients who smoke get more infections. Talk to your doctor about how you can quit before your surgery.  Do not shave near where you will have surgery. Shaving with a razor can irritate your skin and make it easier to develop an infection.  At the time of your surgery:  Speak up if someone tries to shave you with a razor before surgery. Ask why you need to be shaved and talk with your surgeon if you have any concerns.  Ask if you will get antibiotics before surgery.  After your surgery:  Make sure that your healthcare providers clean their hands before examining you, either with soap and water or an alcohol-based hand rub.  Family and friends who visit you should not touch the surgical wound or dressings.  Family and friends should clean their hands with soap and water or an alcohol-based hand rub before and after visiting you. If you do not see them clean their hands,

## 2024-03-27 NOTE — DISCHARGE SUMMARY
Thank you for the opportunity to participate in the care of this patient. If you have any questions or concerns regarding this patient, please do not hesitate to contact the clinic.       Time In 1315   Time Out 1456   Timed Code Minutes: 101 min   Total Treatment Time: 101 min       Electronically Signed by: Farzana Abreu PT

## 2024-04-03 ENCOUNTER — HOSPITAL ENCOUNTER (OUTPATIENT)
Age: 39
Setting detail: OUTPATIENT SURGERY
Discharge: HOME | End: 2024-04-03
Attending: PAIN MEDICINE | Admitting: PAIN MEDICINE
Payer: COMMERCIAL

## 2024-04-03 ENCOUNTER — APPOINTMENT (OUTPATIENT)
Dept: GENERAL RADIOLOGY | Age: 39
End: 2024-04-03
Attending: PAIN MEDICINE
Payer: COMMERCIAL

## 2024-04-03 VITALS
HEIGHT: 77 IN | TEMPERATURE: 97.3 F | DIASTOLIC BLOOD PRESSURE: 80 MMHG | OXYGEN SATURATION: 96 % | WEIGHT: 315 LBS | BODY MASS INDEX: 37.19 KG/M2 | HEART RATE: 69 BPM | RESPIRATION RATE: 16 BRPM | SYSTOLIC BLOOD PRESSURE: 141 MMHG

## 2024-04-03 PROCEDURE — 2709999900 HC NON-CHARGEABLE SUPPLY: Performed by: PAIN MEDICINE

## 2024-04-03 PROCEDURE — 64634 DESTROY C/TH FACET JNT ADDL: CPT | Performed by: PAIN MEDICINE

## 2024-04-03 PROCEDURE — 3600000057 HC PAIN LEVEL 4 ADDL 15 MIN: Performed by: PAIN MEDICINE

## 2024-04-03 PROCEDURE — 2500000003 HC RX 250 WO HCPCS: Performed by: PAIN MEDICINE

## 2024-04-03 PROCEDURE — 64633 DESTROY CERV/THOR FACET JNT: CPT | Performed by: PAIN MEDICINE

## 2024-04-03 PROCEDURE — 7100000010 HC PHASE II RECOVERY - FIRST 15 MIN: Performed by: PAIN MEDICINE

## 2024-04-03 PROCEDURE — 6360000002 HC RX W HCPCS: Performed by: PAIN MEDICINE

## 2024-04-03 PROCEDURE — 99152 MOD SED SAME PHYS/QHP 5/>YRS: CPT | Performed by: PAIN MEDICINE

## 2024-04-03 PROCEDURE — 99153 MOD SED SAME PHYS/QHP EA: CPT | Performed by: PAIN MEDICINE

## 2024-04-03 PROCEDURE — 7100000011 HC PHASE II RECOVERY - ADDTL 15 MIN: Performed by: PAIN MEDICINE

## 2024-04-03 PROCEDURE — 3600000056 HC PAIN LEVEL 4 BASE: Performed by: PAIN MEDICINE

## 2024-04-03 RX ORDER — BUPIVACAINE HYDROCHLORIDE 5 MG/ML
INJECTION, SOLUTION PERINEURAL PRN
Status: DISCONTINUED | OUTPATIENT
Start: 2024-04-03 | End: 2024-04-03 | Stop reason: ALTCHOICE

## 2024-04-03 RX ORDER — LIDOCAINE HYDROCHLORIDE 20 MG/ML
INJECTION, SOLUTION INFILTRATION; PERINEURAL PRN
Status: DISCONTINUED | OUTPATIENT
Start: 2024-04-03 | End: 2024-04-03 | Stop reason: ALTCHOICE

## 2024-04-03 RX ORDER — FENTANYL CITRATE 50 UG/ML
INJECTION, SOLUTION INTRAMUSCULAR; INTRAVENOUS PRN
Status: DISCONTINUED | OUTPATIENT
Start: 2024-04-03 | End: 2024-04-03 | Stop reason: ALTCHOICE

## 2024-04-03 ASSESSMENT — PAIN - FUNCTIONAL ASSESSMENT
PAIN_FUNCTIONAL_ASSESSMENT: 0-10
PAIN_FUNCTIONAL_ASSESSMENT: 0-10

## 2024-04-03 NOTE — PROGRESS NOTES
0947 Patient to phase 2 from surgery, report from Cam RN. Patient alert, oriented, appropriate. Denies any pain at this time. Vitals stable on room air. Injection site clean, dry, intact. Call light in reach.   0950 Snack and drink given per preference.   0959 IV taken out and dressing applied with no complications.  1005 Patient walked to discharge doors in stable condition. Discharged with AVS and all belongings-denies any questions regarding AVS.

## 2024-04-03 NOTE — OP NOTE
Pre-Procedure Note    Patient Name: Wyatt Gallardo   YOB: 1985  Medical Record Number: [id number]  Date: 4/3/24     Indication:  Neck pain    Consent: On file.    Vital Signs:   Vitals:    04/03/24 0843   BP: (!) 140/83   Pulse: 68   Resp: 16   Temp: 97.1 °F (36.2 °C)   SpO2: 96%       Past Medical History:   has a past medical history of Diabetes mellitus (HCC), Hypertension, and Insomnia.    Past Surgical History:   has a past surgical history that includes FL SHOULDER ARTHROGRAM RIGHT S&I (Left, 2005); Carpal tunnel release (Bilateral, 2020); Cervical disc surgery (2021); Tonsillectomy and adenoidectomy (1988); Facet joint injection (Bilateral, 1/25/2024); and Facet joint injection (Bilateral, 3/6/2024).    Pre-Sedation Documentation and Exam:   Vital signs have been reviewed (see flow sheet for vitals).     Sedation/ Anesthesia :  IV sedation    Patient is an appropriate candidate for plan of sedation: yes      Preoperative Diagnosis:  Cervical spondylosis    Post-Op Dx: as above    Procedure Performed:  Radiofrequency abalation of median branchs at the levels of C4-5 and C5-6 bilateral under fluoroscopic guidance     Indication for the Procedure:  The patient had history of chronic neck pain that is not responding well to the conservative treatment.  Patient's pain is mostly axial in nature.  Pain is interfering with the activities of daily living.  Physical examination revealed facet tenderness and facet loading is positive.  The patient had undergone cervical  facet joint injections with pain relief that lasted for only a short period of time and confirmatory median branch block gave patient pain relief of 80 % .  Hence we decided to do radiofrequency abalation of median branches for long term pain relief.  The procedure and risks were discussed with the patient and an informed consent was obtained.    Procedure:      A meaningful communication was kept up with the patient throughout  the

## 2024-04-03 NOTE — H&P
neural foramina narrowing.     C5-C6: Moderate left neural foramina narrowing secondary due to uncovertebral   and facet hypertrophic changes. The right neuroforamina is patent. Mild   central canal stenosis.     C6-C7 and C7-T1: No significant disc protrusions, neural foramina narrowing or   central canal stenosis      FINDINGS:     7 cervical, 12 thoracic and 5 lumbar type vertebrae identified.   Developmentally narrow canal is noted.     Alignment is normal.     Vertebral bodies are within normal limits in height and marrow signal.     Paraspinal soft tissues are within normal limits.     Multilevel disc desiccation is seen.     No cord signal abnormality is identified.     Disc herniations are noted at T4-T5, T5-6 and T6-T7, T7 and T8 and T8-T9   levels causing mild to moderate spinal stenosis at these levels.              FINDINGS:   RC/Biceps Tendons:   There is tendinosis of the infraspinatus tendon with a broad-based articular   sided tear extending from the footplate to the critical zone.     There is minimal tendinosis of the supraspinatus tendon. No discrete tear.     The subscapularis and teres minor tendons are intact. The biceps tendon is   normal in signal intensity and anatomically positioned within the bicipital   groove.       AC Joints:   The acromioclavicular joint appears anatomically aligned. The subacromial fat   is effaced.     GH Joint and Labrum:   The glenohumeral joint is aligned. No significant effusion.  The superior and   anterior labrum are intact. There is a tear within the posterior labrum with   an adjacent chondral defect.     Bones:   Bone marrow signal intensity is age appropriate.     Soft Tissue:   There is no obvious soft tissue swelling.      XR SHOULDER LEFT (MIN 2 VIEWS)  Order: [id number]  Impression     IMPRESSION:    1.  Posterior subluxation of the humeral head relative to the glenoid.  2.  Acromioclavicular osteoarthritis.      Joint:  The acromioclavicular joint is

## 2024-04-03 NOTE — PRE SEDATION
increased pain, or bleeding.  Medications:I will take over Trazodone but lower to 25 mg . Continue Cymbalta 60   Lyrica 50 mg  TID   If patient is on blood thinners will need approval to hold: yes or no:  Does patient have implanted device Stimulator, AICD or Pacemaker etc?   Referral to Dr Luke to rule out Thoracic Outlet Syndrome   Reviewed Hip pelvis XR  ordered  FCE ordered pending  March 27,2024         Return for  Cervical  RFA @ C4-5,5-6 Bilateral with IV sedation under Fluorsoscopy .                           MEDICAL HISTORY       has a past medical history of Diabetes mellitus (HCC), Hypertension, and Insomnia.  SURGICAL HISTORY   has a past surgical history that includes FL SHOULDER ARTHROGRAM RIGHT S&I (Left, 2005); Carpal tunnel release (Bilateral, 2020); Cervical disc surgery (2021); Tonsillectomy and adenoidectomy (1988); Facet joint injection (Bilateral, 1/25/2024); and Facet joint injection (Bilateral, 3/6/2024).    ALLERGIES   Allergies as of 03/20/2024 - Fully Reviewed 03/20/2024   Allergen Reaction Noted    Pcn [penicillins] Rash 01/10/2024       MEDICATIONS   Coumadin Use Last 7 Days No  Antiplatelet drug therapy use last 7 days  No  Other anticoagulant use last 7 days  No  Prior to Admission medications    Medication Sig Start Date End Date Taking? Authorizing Provider   pregabalin (LYRICA) 50 MG capsule Take 1 capsule by mouth 3 times daily for 90 days. Max Daily Amount: 150 mg 3/20/24 6/18/24  Adelia Pérez, APRN - CNP   predniSONE (DELTASONE) 1 MG tablet Take by mouth daily Patient on steroid taper started 3/4  Patient not taking: Reported on 4/3/2024    ProviderGuillermina MD   lisinopril-hydroCHLOROthiazide (PRINZIDE;ZESTORETIC) 20-12.5 MG per tablet Take 1 tablet by mouth daily    Guillermina Jordan MD   traZODone (DESYREL) 150 MG tablet Take 1 tablet by mouth nightly    Guillermina Jordan MD   allopurinol (ZYLOPRIM) 100 MG tablet Take 1 tablet by mouth daily    Provider

## 2024-04-03 NOTE — PROGRESS NOTES
Discharge instructions provided to patient and voiced understanding. Call light provided to patient and instructed on use.

## 2024-04-03 NOTE — POST SEDATION
IV sedation was used during the procedure:  - Moderate intravenous conscious sedation was supervised by Dr. Pace  - The patient was independently monitored by a Registered Nurse assigned to the procedure room  - Monitoring included automated blood pressure, continuous EKG, and continuous pulse oximetry  - The detailed conscious record is permanently stored in the Hospital Information System  - The following is the conscious sedation record:  Start Time: 0924  End Time : 0947  Duration: 15 minutes   Medications Administered: 100 mcg Fentanyl  Complications: none  EBL-0

## 2024-05-01 ENCOUNTER — TELEPHONE (OUTPATIENT)
Dept: PHYSICAL MEDICINE AND REHAB | Age: 39
End: 2024-05-01

## 2024-05-01 ENCOUNTER — OFFICE VISIT (OUTPATIENT)
Dept: PHYSICAL MEDICINE AND REHAB | Age: 39
End: 2024-05-01
Payer: COMMERCIAL

## 2024-05-01 VITALS
SYSTOLIC BLOOD PRESSURE: 126 MMHG | WEIGHT: 315 LBS | HEIGHT: 77 IN | DIASTOLIC BLOOD PRESSURE: 78 MMHG | HEART RATE: 68 BPM | BODY MASS INDEX: 37.19 KG/M2

## 2024-05-01 DIAGNOSIS — M79.18 MYOFASCIAL PAIN SYNDROME: ICD-10-CM

## 2024-05-01 DIAGNOSIS — M53.3 SACROILIAC JOINT DYSFUNCTION: ICD-10-CM

## 2024-05-01 DIAGNOSIS — G89.4 CHRONIC PAIN SYNDROME: ICD-10-CM

## 2024-05-01 DIAGNOSIS — G62.9 NEUROPATHY: ICD-10-CM

## 2024-05-01 DIAGNOSIS — M46.1 SI (SACROILIAC) JOINT INFLAMMATION (HCC): Primary | ICD-10-CM

## 2024-05-01 DIAGNOSIS — M54.17 LUMBOSACRAL RADICULITIS: ICD-10-CM

## 2024-05-01 DIAGNOSIS — M47.816 LUMBAR SPONDYLOSIS: ICD-10-CM

## 2024-05-01 DIAGNOSIS — M79.7 FIBROMYALGIA: ICD-10-CM

## 2024-05-01 DIAGNOSIS — M47.812 CERVICAL SPONDYLOSIS: ICD-10-CM

## 2024-05-01 PROCEDURE — 99214 OFFICE O/P EST MOD 30 MIN: CPT | Performed by: NURSE PRACTITIONER

## 2024-05-01 PROCEDURE — 1036F TOBACCO NON-USER: CPT | Performed by: NURSE PRACTITIONER

## 2024-05-01 PROCEDURE — G8417 CALC BMI ABV UP PARAM F/U: HCPCS | Performed by: NURSE PRACTITIONER

## 2024-05-01 PROCEDURE — G8427 DOCREV CUR MEDS BY ELIG CLIN: HCPCS | Performed by: NURSE PRACTITIONER

## 2024-05-01 ASSESSMENT — ENCOUNTER SYMPTOMS: BACK PAIN: 1

## 2024-05-01 NOTE — PROGRESS NOTES
4Functionality Assessment/Goals Worksheet     On a scale of 0 (Does not Interfere) to 10 (Completely Interferes)     1.  Which number describes how during the past week pain has interfered with           the following:  A.  General Activity:  4  B.  Mood: 6  C.  Walking Ability:  0  D.  Normal Work (Includes both work outside the home and housework):  4  E.  Relations with Other People:   7  F.  Sleep:   4  G.  Enjoyment of Life:   6    2.  Patient Prefers to Take their Pain Medications:     []  On a regular basis   []  Only when necessary    []  Does not take pain medications    3.  What are the Patient's Goals/Expectations for Visiting Pain Management?     []  Learn about my pain    []  Receive Medication   []  Physical Therapy     []  Treat Depression   [x]  Receive Injections    []  Treat Sleep   []  Deal with Anxiety and Stress   []  Treat Opoid Dependence/Addiction   []  Other:       HPI:     ChiefComplaint: Lumbar back pain, cervical pain , SI pain, and Fibromyalgia Myofascial       F/U  Cervical  RFA @ C4-5,5-6 Bilateral with IV sedation under Fluorsoscopy  4/3/2024 states he is receiving about 50% pain relief or benefit. He still has some top of shoulders and into shoulder blade burning tingling.  States has helped his sleeping less headaches .   He uses ice and conitnues water therapy.     His right SI is his main pain complaint. He continues chiropractor. Pain increases with walking standing bending lifting. He is working on his posture.         XR HIP due to  right hip SI  pain thigh pain stabbing pain in thigh   Narrative & Impression  PROCEDURE: XR HIP 3-4 VW W PELVIS BILATERAL     CLINICAL INFORMATION: SI (sacroiliac) joint inflammation (HCC), Bilateral hip pain, Bilateral hip pain.     COMPARISON: No prior study.     TECHNIQUE: AP and frog-leg lateral views were obtained  of the right and left hips.     FINDINGS:        There is no fracture or dislocation. The joint space is preserved.  No

## 2024-05-07 DIAGNOSIS — M46.1 SACROILIAC INFLAMMATION (HCC): Primary | ICD-10-CM

## 2024-05-14 ENCOUNTER — HOSPITAL ENCOUNTER (OUTPATIENT)
Age: 39
Setting detail: OUTPATIENT SURGERY
Discharge: HOME | End: 2024-05-14
Attending: PAIN MEDICINE | Admitting: PAIN MEDICINE
Payer: COMMERCIAL

## 2024-05-14 ENCOUNTER — APPOINTMENT (OUTPATIENT)
Dept: GENERAL RADIOLOGY | Age: 39
End: 2024-05-14
Attending: PAIN MEDICINE
Payer: COMMERCIAL

## 2024-05-14 VITALS
OXYGEN SATURATION: 94 % | SYSTOLIC BLOOD PRESSURE: 114 MMHG | TEMPERATURE: 96.9 F | BODY MASS INDEX: 37.19 KG/M2 | HEART RATE: 73 BPM | DIASTOLIC BLOOD PRESSURE: 68 MMHG | HEIGHT: 77 IN | RESPIRATION RATE: 16 BRPM | WEIGHT: 315 LBS

## 2024-05-14 PROBLEM — M46.1 SACROILIAC INFLAMMATION (HCC): Status: ACTIVE | Noted: 2024-05-14

## 2024-05-14 PROCEDURE — 2709999900 HC NON-CHARGEABLE SUPPLY: Performed by: PAIN MEDICINE

## 2024-05-14 PROCEDURE — 3600000054 HC PAIN LEVEL 3 BASE: Performed by: PAIN MEDICINE

## 2024-05-14 PROCEDURE — 27096 INJECT SACROILIAC JOINT: CPT | Performed by: PAIN MEDICINE

## 2024-05-14 PROCEDURE — 6360000002 HC RX W HCPCS: Performed by: PAIN MEDICINE

## 2024-05-14 PROCEDURE — 2500000003 HC RX 250 WO HCPCS: Performed by: PAIN MEDICINE

## 2024-05-14 PROCEDURE — 6360000004 HC RX CONTRAST MEDICATION: Performed by: PAIN MEDICINE

## 2024-05-14 PROCEDURE — 7100000010 HC PHASE II RECOVERY - FIRST 15 MIN: Performed by: PAIN MEDICINE

## 2024-05-14 RX ORDER — TRAZODONE HYDROCHLORIDE 50 MG/1
25 TABLET ORAL NIGHTLY
COMMUNITY

## 2024-05-14 RX ORDER — METHYLPREDNISOLONE ACETATE 80 MG/ML
INJECTION, SUSPENSION INTRA-ARTICULAR; INTRALESIONAL; INTRAMUSCULAR; SOFT TISSUE PRN
Status: DISCONTINUED | OUTPATIENT
Start: 2024-05-14 | End: 2024-05-14 | Stop reason: ALTCHOICE

## 2024-05-14 RX ORDER — LIDOCAINE HYDROCHLORIDE 20 MG/ML
INJECTION, SOLUTION INFILTRATION; PERINEURAL PRN
Status: DISCONTINUED | OUTPATIENT
Start: 2024-05-14 | End: 2024-05-14 | Stop reason: ALTCHOICE

## 2024-05-14 RX ORDER — DULOXETIN HYDROCHLORIDE 60 MG/1
60 CAPSULE, DELAYED RELEASE ORAL DAILY
COMMUNITY

## 2024-05-14 RX ORDER — BUPIVACAINE HYDROCHLORIDE 5 MG/ML
INJECTION, SOLUTION PERINEURAL PRN
Status: DISCONTINUED | OUTPATIENT
Start: 2024-05-14 | End: 2024-05-14 | Stop reason: ALTCHOICE

## 2024-05-14 ASSESSMENT — PAIN - FUNCTIONAL ASSESSMENT
PAIN_FUNCTIONAL_ASSESSMENT: NONE - DENIES PAIN
PAIN_FUNCTIONAL_ASSESSMENT: 0-10

## 2024-05-14 NOTE — OP NOTE
Pre-Procedure Note    Patient Name: Wyatt Gallardo   YOB: 1985  Medical Record Number: [id number]  Date: 5/14/24     Indication:  Right SI pain    Consent: On file.    Vital Signs:   Vitals:    05/14/24 0951   BP: 130/82   Pulse: 78   Resp: 16   Temp: 97.6 °F (36.4 °C)   SpO2: 95%       Past Medical History:   has a past medical history of Diabetes mellitus (HCC), Hypertension, and Insomnia.    Past Surgical History:   has a past surgical history that includes FL SHOULDER ARTHROGRAM RIGHT S&I (Left, 2005); Carpal tunnel release (Bilateral, 2020); Cervical disc surgery (2021); Tonsillectomy and adenoidectomy (1988); Facet joint injection (Bilateral, 1/25/2024); Facet joint injection (Bilateral, 3/6/2024); and Pain management procedure (Bilateral, 4/3/2024).    Pre-Sedation Documentation and Exam:   Vital signs have been reviewed (see flow sheet for vitals).     Sedation/ Anesthesia Plan: MAC    Patient is an appropriate candidate for plan of sedation: yes    Preoperative Diagnosis:  Right sacroiliitis.    Postoperative Diagnosis: Right  Sacroiliitis.    Procedure Performed:  Right sacroiliac joint injection under fluoroscopy guidance .      Indication for the Procedure:  The patient failed conservative management  for pain in low back.  The patient is tender over the Right SI joint.  Kaleb's test is positive on the Right side.  As patient is not responding to conservative management and pain is interfering with activities of daily living we decided to proceed with SI joint injection. The procedure and risks were discussed with the patient and an informed consent was obtained.    Procedure:     A meaningful communication was kept up with the patient throughout the procedure.  The patient is placed in prone position.  Skin over the back was prepped and draped in sterile manner.  Then using fluoroscopy the Right sacroiliac joint was identified. Then the angle of the fluoroscopy was adjusted such that the

## 2024-05-14 NOTE — PROGRESS NOTES
1050-Patient to Phase II via cart. Report received from Marta VARELA. Patient awake-local. Vitals obtained and stable. Respirations even and unlabored on room air. Patient denies pain, nausea, numbness and tingling. Patient able to move all extremities. No drainage noted at injection sites. Patient getting dressed at bedside.  1055-Patient meets discharge criteria.  Discharged in stable condition with responsible . All belongings given to patient. Patient ambulated to car with assistance from RN. Patient tolerated well.

## 2024-05-14 NOTE — H&P
H&P      Patient  right SI is his main pain complaint. He continues chiropractor. Pain increases with walking standing bending lifting. He is working on his posture.            XR HIP due to  right hip SI  pain thigh pain stabbing pain in thigh   Narrative & Impression  PROCEDURE: XR HIP 3-4 VW W PELVIS BILATERAL     CLINICAL INFORMATION: SI (sacroiliac) joint inflammation (HCC), Bilateral hip pain, Bilateral hip pain.     COMPARISON: No prior study.     TECHNIQUE: AP and frog-leg lateral views were obtained  of the right and left hips.     FINDINGS:        There is no fracture or dislocation. The joint space is preserved.  No degenerative changes are noted. The superior and inferior pubic rami are normal. The sacroiliac joint is normal.  The soft tissues are normal.     IMPRESSION:  Normal right and left hip and sacroiliac joints...      Tolerating Lyrica trial states more alert than Neurontin.  We will stop Neurontin  at HS now and  replace with Lyrica TID instead of BID. He continues water therapy   He had less headaches more ROM less pain more feeling in his cervical incision which has been numb since surgery. It was more hypersensitive .   Here with mother. The cervical pain and headaches have returned.   He is having some right sided mostly  thoracic pain  under scapular area mostly.   He has been having increase hip pain  mostly when goes to stand. He has been having Fibromyalgia flares also. He continues water therapy. States helps him the most.   Lyrica trial is helping his abdomen or torso nerve pain.   He feels his Fibromyalgia nerve pain is his main problem form hips waste down to thighs. Sometimes he can't walk.  Stress increases his Fibromyalgia.  He has made multiple lifestyle dietary changes      He is pending  disability claim appeals.       Follows Psych  for  MDD seen 1/17/2024 he has been having increased anxiety and brain fog.      I referred to Dr Luke to rule out Thoracic Outlet Syndrome

## 2024-05-30 ENCOUNTER — TELEPHONE (OUTPATIENT)
Dept: PHYSICAL MEDICINE AND REHAB | Age: 39
End: 2024-05-30

## 2024-05-30 ENCOUNTER — OFFICE VISIT (OUTPATIENT)
Dept: PHYSICAL MEDICINE AND REHAB | Age: 39
End: 2024-05-30
Payer: COMMERCIAL

## 2024-05-30 VITALS
HEART RATE: 74 BPM | SYSTOLIC BLOOD PRESSURE: 130 MMHG | DIASTOLIC BLOOD PRESSURE: 80 MMHG | WEIGHT: 315 LBS | HEIGHT: 77 IN | BODY MASS INDEX: 37.19 KG/M2

## 2024-05-30 DIAGNOSIS — M54.17 LUMBOSACRAL RADICULITIS: ICD-10-CM

## 2024-05-30 DIAGNOSIS — G89.4 CHRONIC PAIN SYNDROME: ICD-10-CM

## 2024-05-30 DIAGNOSIS — M53.3 SACROILIAC JOINT DYSFUNCTION: ICD-10-CM

## 2024-05-30 DIAGNOSIS — M79.7 FIBROMYALGIA: ICD-10-CM

## 2024-05-30 DIAGNOSIS — M47.816 LUMBAR SPONDYLOSIS: ICD-10-CM

## 2024-05-30 DIAGNOSIS — M47.812 CERVICAL SPONDYLOSIS: ICD-10-CM

## 2024-05-30 DIAGNOSIS — M46.1 SI (SACROILIAC) JOINT INFLAMMATION (HCC): Primary | ICD-10-CM

## 2024-05-30 DIAGNOSIS — M79.18 MYOFASCIAL PAIN SYNDROME: ICD-10-CM

## 2024-05-30 DIAGNOSIS — G62.9 NEUROPATHY: ICD-10-CM

## 2024-05-30 PROCEDURE — 64451 NJX AA&/STRD NRV NRVTG SI JT: CPT | Performed by: NURSE PRACTITIONER

## 2024-05-30 PROCEDURE — G8427 DOCREV CUR MEDS BY ELIG CLIN: HCPCS | Performed by: NURSE PRACTITIONER

## 2024-05-30 PROCEDURE — 1036F TOBACCO NON-USER: CPT | Performed by: NURSE PRACTITIONER

## 2024-05-30 PROCEDURE — 99214 OFFICE O/P EST MOD 30 MIN: CPT | Performed by: NURSE PRACTITIONER

## 2024-05-30 PROCEDURE — G8417 CALC BMI ABV UP PARAM F/U: HCPCS | Performed by: NURSE PRACTITIONER

## 2024-05-30 RX ORDER — PREGABALIN 50 MG/1
50 CAPSULE ORAL 3 TIMES DAILY
Qty: 270 CAPSULE | Refills: 0 | Status: SHIPPED | OUTPATIENT
Start: 2024-05-30 | End: 2024-08-28

## 2024-05-30 ASSESSMENT — ENCOUNTER SYMPTOMS: BACK PAIN: 1

## 2024-05-30 NOTE — PROGRESS NOTES
Functionality Assessment/Goals Worksheet     On a scale of 0 (Does not Interfere) to 10 (Completely Interferes)     1.  Which number describes how during the past week pain has interfered with           the following:  A.  General Activity:  5  B.  Mood: 6  C.  Walking Ability:  5  D.  Normal Work (Includes both work outside the home and housework):  5  E.  Relations with Other People:   8  F.  Sleep:   5  G.  Enjoyment of Life:   7    2.  Patient Prefers to Take their Pain Medications:     [x]  On a regular basis   []  Only when necessary    []  Does not take pain medications    3.  What are the Patient's Goals/Expectations for Visiting Pain Management?     []  Learn about my pain    [x]  Receive Medication   []  Physical Therapy     []  Treat Depression   [x]  Receive Injections    []  Treat Sleep   []  Deal with Anxiety and Stress   []  Treat Opoid Dependence/Addiction   []  Other:     HPI:     ChiefComplaint: Lumbar back pain, cervical pain , SI pain, and Fibromyalgia Myofascial       F/U  Right SI diagnostic injection with IV sedation 5/14/2024  states  he is receiving about 70% pain relief or benefit  the first 2 weeks and now about 50% pain relief or benefit.     His left shoulder axilla Pectoralis region is painful limited ROM.He conitnues swimming therapy     His right SI is his main pain complaint. He continues chiropractor. Pain increases with walking standing bending lifting. He is working on his posture.       XR HIP due to  right hip SI  pain thigh pain stabbing pain in thigh   Narrative & Impression  PROCEDURE: XR HIP 3-4 VW W PELVIS BILATERAL     CLINICAL INFORMATION: SI (sacroiliac) joint inflammation (HCC), Bilateral hip pain, Bilateral hip pain.     COMPARISON: No prior study.     TECHNIQUE: AP and frog-leg lateral views were obtained  of the right and left hips.     FINDINGS:        There is no fracture or dislocation. The joint space is preserved.  No degenerative changes are noted. The superior

## 2024-06-17 NOTE — DISCHARGE INSTRUCTIONS
ANESTHESIA INSTRUCTIONS FOLLOWING SURGERY        Since you may experience some intermittent light-headedness for the next several hours, we suggest you plan on bed rest or quiet relaxation this evening. You must have a friend or relative stay with you tonight.    Because of the sedation you have received, it is recommended that you do not drive a motor vehicle, operate any kind of machinery, or sign any contractual agreement for 24 hours following the procedure.    You should not take alcoholic beverages tonight and only take sleeping medication that has been specifically prescribed for you by your physician.  Call office [phone] if you have:  Temperature greater than 100.4  Persistent nausea and vomiting  Severe uncontrolled pain  Redness, tenderness, or signs of infection (pain, swelling, redness, odor or green/yellow discharge around the site)  Difficulty breathing, headache or visual disturbances  Hives  Persistent dizziness or light-headedness  Extreme fatigue  Any other questions or concerns you may have after discharge    In an emergency, call 911 or go to an Emergency Department at a nearby hospital    It is important to bring a complete, current list of your medications to any medical appointments or hospitalizations.    REMINDER:   Carry a list of your medications and allergies with you at all times  Call your pharmacy at least 1 week in advance to refill prescriptions    Diet: Resume your usual diet. Good nutrition promotes healing. Increase fluid intake.     Activity: Rest for 24 hrs then resume normal activity.    HOME MEDICATIONS:      If on blood thinning products such as; Aspirin, NSAIDS, Plavix, Coumadin, Xarelto, Fish Oil, Multi-Vitamins or Herbal Supplements restart in 24 hours      Restart Metformin in 48 hours if you had procedure with dye.      Restart Metformin in 24 hours if no dye used during procedure.        Education Materials Received: {yes/no:413239}  Belongings Returned: 
How Severe Is Your Skin Lesion?: moderate
Have Your Skin Lesions Been Treated?: been treated
Is This A New Presentation, Or A Follow-Up?: Skin Lesions
Additional History: He went to a [facility] dermatologist who treated with Fluorouracil.  That made the lip red and swollen except for the white spots that he is concerned about today.

## 2024-06-18 ENCOUNTER — APPOINTMENT (OUTPATIENT)
Dept: GENERAL RADIOLOGY | Age: 39
End: 2024-06-18
Attending: PAIN MEDICINE
Payer: COMMERCIAL

## 2024-06-18 ENCOUNTER — HOSPITAL ENCOUNTER (OUTPATIENT)
Age: 39
Setting detail: OUTPATIENT SURGERY
Discharge: HOME | End: 2024-06-18
Attending: PAIN MEDICINE | Admitting: PAIN MEDICINE
Payer: COMMERCIAL

## 2024-06-18 VITALS
RESPIRATION RATE: 16 BRPM | WEIGHT: 315 LBS | HEART RATE: 75 BPM | OXYGEN SATURATION: 95 % | BODY MASS INDEX: 37.19 KG/M2 | DIASTOLIC BLOOD PRESSURE: 80 MMHG | SYSTOLIC BLOOD PRESSURE: 127 MMHG | TEMPERATURE: 97.4 F | HEIGHT: 77 IN

## 2024-06-18 PROCEDURE — 6360000002 HC RX W HCPCS: Performed by: PAIN MEDICINE

## 2024-06-18 PROCEDURE — 3600000054 HC PAIN LEVEL 3 BASE: Performed by: PAIN MEDICINE

## 2024-06-18 PROCEDURE — 7100000010 HC PHASE II RECOVERY - FIRST 15 MIN: Performed by: PAIN MEDICINE

## 2024-06-18 PROCEDURE — 64451 NJX AA&/STRD NRV NRVTG SI JT: CPT | Performed by: PAIN MEDICINE

## 2024-06-18 PROCEDURE — 2500000003 HC RX 250 WO HCPCS: Performed by: PAIN MEDICINE

## 2024-06-18 PROCEDURE — 7100000011 HC PHASE II RECOVERY - ADDTL 15 MIN: Performed by: PAIN MEDICINE

## 2024-06-18 PROCEDURE — 99152 MOD SED SAME PHYS/QHP 5/>YRS: CPT | Performed by: PAIN MEDICINE

## 2024-06-18 PROCEDURE — 2709999900 HC NON-CHARGEABLE SUPPLY: Performed by: PAIN MEDICINE

## 2024-06-18 RX ORDER — BUPIVACAINE HYDROCHLORIDE 5 MG/ML
INJECTION, SOLUTION PERINEURAL PRN
Status: DISCONTINUED | OUTPATIENT
Start: 2024-06-18 | End: 2024-06-18 | Stop reason: ALTCHOICE

## 2024-06-18 RX ORDER — FENTANYL CITRATE 50 UG/ML
INJECTION, SOLUTION INTRAMUSCULAR; INTRAVENOUS PRN
Status: DISCONTINUED | OUTPATIENT
Start: 2024-06-18 | End: 2024-06-18 | Stop reason: ALTCHOICE

## 2024-06-18 RX ORDER — MIDAZOLAM HYDROCHLORIDE 1 MG/ML
INJECTION INTRAMUSCULAR; INTRAVENOUS PRN
Status: DISCONTINUED | OUTPATIENT
Start: 2024-06-18 | End: 2024-06-18 | Stop reason: ALTCHOICE

## 2024-06-18 RX ORDER — LIDOCAINE HYDROCHLORIDE 20 MG/ML
INJECTION, SOLUTION INFILTRATION; PERINEURAL PRN
Status: DISCONTINUED | OUTPATIENT
Start: 2024-06-18 | End: 2024-06-18 | Stop reason: ALTCHOICE

## 2024-06-18 RX ORDER — METHYLPREDNISOLONE ACETATE 80 MG/ML
INJECTION, SUSPENSION INTRA-ARTICULAR; INTRALESIONAL; INTRAMUSCULAR; SOFT TISSUE PRN
Status: DISCONTINUED | OUTPATIENT
Start: 2024-06-18 | End: 2024-06-18 | Stop reason: ALTCHOICE

## 2024-06-18 ASSESSMENT — PAIN - FUNCTIONAL ASSESSMENT
PAIN_FUNCTIONAL_ASSESSMENT: NONE - DENIES PAIN
PAIN_FUNCTIONAL_ASSESSMENT: 0-10

## 2024-06-18 ASSESSMENT — PAIN DESCRIPTION - DESCRIPTORS: DESCRIPTORS: ACHING

## 2024-06-18 NOTE — PROGRESS NOTES
1129-Patient to Phase II via cart. Report received from Idaila VARELA. Patient drowsy but responsive.Vitals obtained and stable. Respirations even and unlabored on room air. Patient denies pain, nausea, numbness and tingling. Patient able to move all extremities. No drainage noted at injection sites. Patient instructed to stay in bed. Instructed on call light use.  1132-Patient provided with snack and drink. Denies needs.   1143-IV removed with no complications. Patient getting dressed at bedside. Ride notified of pickup.  1149-Patient meets discharge criteria.  Discharged in stable condition with responsible . All belongings given to patient. Patient ambulated to car with assistance from RN. Patient tolerated well.

## 2024-06-18 NOTE — POST SEDATION
IV sedation was used during the procedure:  - Moderate intravenous conscious sedation was supervised by Dr. Pace  - The patient was independently monitored by a Registered Nurse assigned to the procedure room  - Monitoring included automated blood pressure, continuous EKG, and continuous pulse oximetry  - The detailed conscious record is permanently stored in the Hospital Information System  - The following is the conscious sedation record:  Start Time: 1122  End Time : 1126  Duration: 15 minutes   Medications Administered: 2 mg Versed, 50 mcg Fentanyl  Complications: none  EBL-0

## 2024-06-18 NOTE — H&P
H&P    Patient  Right SI diagnostic injection 5/14/2024  states  he is receiving about 70% pain relief or benefit  the first 2 weeks and now about 50% pain relief or benefit.         His right SI is his main pain complaint. He continues chiropractor. Pain increases with walking standing bending lifting. He is working on his posture.         XR HIP due to  right hip SI  pain thigh pain stabbing pain in thigh   Narrative & Impression  PROCEDURE: XR HIP 3-4 VW W PELVIS BILATERAL     CLINICAL INFORMATION: SI (sacroiliac) joint inflammation (HCC), Bilateral hip pain, Bilateral hip pain.     COMPARISON: No prior study.     TECHNIQUE: AP and frog-leg lateral views were obtained  of the right and left hips.     FINDINGS:        There is no fracture or dislocation. The joint space is preserved.  No degenerative changes are noted. The superior and inferior pubic rami are normal. The sacroiliac joint is normal.  The soft tissues are normal.     IMPRESSION:  Normal right and left hip and sacroiliac joints...      Tolerating Lyrica trial states more alert than Neurontin.  We will stop Neurontin  at HS now and  replace with Lyrica TID instead of BID. He continues water therapy   He had less headaches more ROM less pain more feeling in his cervical incision which has been numb since surgery. It was more hypersensitive .   Here with mother. The cervical pain and headaches have returned.   He is having some right sided mostly  thoracic pain  under scapular area mostly.   He has been having increase hip pain  mostly when goes to stand. He has been having Fibromyalgia flares also. He continues water therapy. States helps him the most.   Lyrica trial is helping his abdomen or torso nerve pain.   He feels his Fibromyalgia nerve pain is his main problem form hips waste down to thighs. Sometimes he can't walk.  Stress increases his Fibromyalgia.  He has made multiple lifestyle dietary changes      He is pending  disability claim appeals.

## 2024-06-18 NOTE — PRE SEDATION
content normal.         Judgment: Judgment normal.         FRANK  Patricks test  positive  Yeoman's or Gaenslen's  positive  Kemps  positive  Spurlings  positive              Assessment  Assessment:      1. SI (sacroiliac) joint inflammation (HCC)    2. Sacroiliac joint dysfunction    3. Lumbosacral radiculitis    4. Lumbar spondylosis    5. Cervical spondylosis    6. Myofascial pain syndrome    7. Fibromyalgia    8. Chronic pain syndrome    9. Neuropathy                         Assessment & Plan  ..      Patient Active Problem List   Diagnosis    Cervical spondylosis    Sacroiliac inflammation (HCC)         Plan:      Testing, Labs or Radiology Reviewed: Cervical shoulder Thoracic  Procedures with Fluoroscopy: Right SI ADVANCED  injection with IV sedation   Discussed with patient about risks with procedure including infection, reaction to medication, increased pain, or bleeding.  Medications:I will take over Trazodone but lower to 25 mg . Continue Cymbalta 60   Lyrica 50 mg  TID  uses medical marijuana   If patient is on blood thinners will need approval to hold: yes or no:  Does patient have implanted device Stimulator, AICD or Pacemaker etc?   Referral to Dr Luke to rule out Thoracic Outlet Syndrome consult reviewed  Reviewed Hip pelvis XR   FCE reviewed  March 27,2024         Return for Right Si  advanced injection with Iv sedation under Fluoroscopy  F/U Jaylin .          MEDICAL HISTORY       has a past medical history of Diabetes mellitus (HCC), Hypertension, and Insomnia.  SURGICAL HISTORY   has a past surgical history that includes FL SHOULDER ARTHROGRAM RIGHT S&I (Left, 2005); Carpal tunnel release (Bilateral, 2020); Cervical disc surgery (2021); Tonsillectomy and adenoidectomy (1988); Facet joint injection (Bilateral, 1/25/2024); Facet joint injection (Bilateral, 3/6/2024); Pain management procedure (Bilateral, 4/3/2024); and Back Injection (Right, 5/14/2024).    ALLERGIES   Allergies as of 05/30/2024

## 2024-06-18 NOTE — OP NOTE
Operative Note      Patient: Wyatt Gallardo  YOB: 1985  MRN: [id number]    Date of Procedure: 6/18/2024    Pre-operative Diagnosis:  SI joint inflammation     Post-operative Diagnosis:  SI joint inflammation     Procedure:       Right SI joint block under fluoroscopy      Anesthesia: IV sedation     Procedure Description:  After having signed the informed consent, the patient was placed in the prone position.  The patient's low back and buttocks was prepped with chloraprep solution, and draped in a sterile fashion. A total of 2-3 ml of 1% lidocaine was used to anesthetize the skin and underlying tissues at each level. Next, 3.5 inch 22 g spinal needles were advanced to the anatomic location of the L5 primary dorsal ramus at the junction of the superior articular process and sacral ala utilizing intermittent fluoroscopy, as well as the S1, S2, and S3 lateral branch nerves at the lateral border of the S1, S2, and S3 posterior/dorsal foramen. Then, a 1 cc mixture of 80 mg depo-medrol and 0.5% bupivacaine was injected at each site. The needles were removed without any complications. The patient tolerated the procedure well, was transported to the recovery room and observed for 15 minutes and discharged in an ambulatory fashion. No immediate reported complications.     Procedural Complications: None  Estimated Blood Loss: 0 mL        Rajan Leonard MD

## 2024-07-25 ENCOUNTER — OFFICE VISIT (OUTPATIENT)
Dept: PAIN MANAGEMENT | Age: 39
End: 2024-07-25
Payer: COMMERCIAL

## 2024-07-25 VITALS
DIASTOLIC BLOOD PRESSURE: 70 MMHG | SYSTOLIC BLOOD PRESSURE: 132 MMHG | WEIGHT: 315 LBS | BODY MASS INDEX: 37.19 KG/M2 | HEIGHT: 77 IN

## 2024-07-25 DIAGNOSIS — M46.1 SACROILIAC INFLAMMATION (HCC): ICD-10-CM

## 2024-07-25 DIAGNOSIS — M54.81 BILATERAL OCCIPITAL NEURALGIA: Primary | ICD-10-CM

## 2024-07-25 DIAGNOSIS — M47.814 THORACIC SPONDYLOSIS: ICD-10-CM

## 2024-07-25 DIAGNOSIS — M47.812 CERVICAL SPONDYLOSIS: ICD-10-CM

## 2024-07-25 DIAGNOSIS — M53.3 SACROILIAC JOINT DYSFUNCTION: ICD-10-CM

## 2024-07-25 DIAGNOSIS — Z13.828 SCOLIOSIS CONCERN: ICD-10-CM

## 2024-07-25 DIAGNOSIS — G62.9 NEUROPATHY: ICD-10-CM

## 2024-07-25 DIAGNOSIS — M47.816 LUMBAR SPONDYLOSIS: ICD-10-CM

## 2024-07-25 PROCEDURE — 1036F TOBACCO NON-USER: CPT | Performed by: NURSE PRACTITIONER

## 2024-07-25 PROCEDURE — G8417 CALC BMI ABV UP PARAM F/U: HCPCS | Performed by: NURSE PRACTITIONER

## 2024-07-25 PROCEDURE — 99214 OFFICE O/P EST MOD 30 MIN: CPT | Performed by: NURSE PRACTITIONER

## 2024-07-25 PROCEDURE — G8427 DOCREV CUR MEDS BY ELIG CLIN: HCPCS | Performed by: NURSE PRACTITIONER

## 2024-07-25 ASSESSMENT — ENCOUNTER SYMPTOMS: BACK PAIN: 1

## 2024-07-25 NOTE — PROGRESS NOTES
[facility] PROFESSIONAL SERVS  [facility] NEUROSCIENCE AND REHABILITATION [address]  [facility] [address]  Dept: [phone]  Dept Fax: [phone]  Loc: [phone]    Visit Date: 7/25/2024    Functionality Assessment/Goals Worksheet     On a scale of 0 (Does not Interfere) to 10 (Completely Interferes)     1.  Which number describes how during the past week pain has interfered with       the following:  A.  General Activity:  6  B.  Mood: 6  C.  Walking Ability:  6  D.  Normal Work (Includes both work outside the home and housework):  6  E.  Relations with Other People:   6  F.  Sleep:   6  G.  Enjoyment of Life:   6    2.  Patient Prefers to Take their Pain Medications:     [x]  On a regular basis   []  Only when necessary    []  Does not take pain medications    3.  What are the Patient's Goals/Expectations for Visiting Pain Management?     [x]  Learn about my pain    [x]  Receive Medication   []  Physical Therapy     []  Treat Depression   [x]  Receive Injections    []  Treat Sleep   []  Deal with Anxiety and Stress   []  Treat Opoid Dependence/Addiction   []  Other:  
(HCC), Bilateral hip pain, Bilateral hip pain.     COMPARISON: No prior study.     TECHNIQUE: AP and frog-leg lateral views were obtained  of the right and left hips.     FINDINGS:        There is no fracture or dislocation. The joint space is preserved.  No degenerative changes are noted. The superior and inferior pubic rami are normal. The sacroiliac joint is normal.  The soft tissues are normal.     IMPRESSION:  Normal right and left hip and sacroiliac joints...     Tolerating Lyrica    Headaches have returned. Cervical neck pain is well after RFA  He is having some right sided mostly  thoracic pain  under scapular area mostly.   He has been having increase hip pain  mostly when goes to stand. He has been having Fibromyalgia flares also. He continues water therapy. States helps him the most.   Lyrica trial is helping his abdomen or torso nerve pain.   He feels his Fibromyalgia nerve pain is his main problem form hips waste down to thighs. Sometimes he can't walk.  Stress increases his Fibromyalgia.  He has made multiple lifestyle dietary changes     He is pending  disability claim appeals.      Follows Psych  for  MDD seen 1/17/2024 he has been having increased anxiety and brain fog.     I referred to Dr Luke to rule out Thoracic Outlet Syndrome see results below   We will order more PT OT              HPI  New pt here with mom, transferring care from [facility] due to lapse in insurance and then tired of driving  that far. He was going to  [facility] for past 3 years. He wasn't very happy with  the treatment plan.  He has had several injections some with relief some without. He most recently stopped his Neurontin due to Dr not refilling and states he felt more clearer minded  not on the medication but was in a  lot more pain. Then was refilled and now taking.   He currently takes Neurontin  Cymbalta Diclofenac Trazodone Hydroxyzine. He feels like he is weak and tired all the

## 2024-08-13 ENCOUNTER — HOSPITAL ENCOUNTER (OUTPATIENT)
Dept: GENERAL RADIOLOGY | Age: 39
Discharge: HOME | End: 2024-08-13
Payer: COMMERCIAL

## 2024-08-13 ENCOUNTER — OFFICE VISIT (OUTPATIENT)
Dept: PHYSICAL MEDICINE AND REHAB | Age: 39
End: 2024-08-13
Payer: COMMERCIAL

## 2024-08-13 ENCOUNTER — HOSPITAL ENCOUNTER (OUTPATIENT)
Age: 39
Discharge: HOME | End: 2024-08-13
Payer: COMMERCIAL

## 2024-08-13 VITALS
WEIGHT: 315 LBS | DIASTOLIC BLOOD PRESSURE: 86 MMHG | HEIGHT: 77 IN | SYSTOLIC BLOOD PRESSURE: 134 MMHG | BODY MASS INDEX: 37.19 KG/M2

## 2024-08-13 DIAGNOSIS — M47.812 CERVICAL SPONDYLOSIS: ICD-10-CM

## 2024-08-13 DIAGNOSIS — M47.816 LUMBAR SPONDYLOSIS: ICD-10-CM

## 2024-08-13 DIAGNOSIS — Z13.828 SCOLIOSIS CONCERN: ICD-10-CM

## 2024-08-13 DIAGNOSIS — M47.814 THORACIC SPONDYLOSIS: ICD-10-CM

## 2024-08-13 DIAGNOSIS — M54.81 BILATERAL OCCIPITAL NEURALGIA: Primary | ICD-10-CM

## 2024-08-13 PROCEDURE — 72082 X-RAY EXAM ENTIRE SPI 2/3 VW: CPT

## 2024-08-13 PROCEDURE — 64405 NJX AA&/STRD GR OCPL NRV: CPT | Performed by: PAIN MEDICINE

## 2024-08-13 RX ORDER — TRIAMCINOLONE ACETONIDE 40 MG/ML
80 INJECTION, SUSPENSION INTRA-ARTICULAR; INTRAMUSCULAR ONCE
Status: COMPLETED | OUTPATIENT
Start: 2024-08-13 | End: 2024-08-13

## 2024-08-13 RX ADMIN — TRIAMCINOLONE ACETONIDE 80 MG: 40 INJECTION, SUSPENSION INTRA-ARTICULAR; INTRAMUSCULAR at 12:18

## 2024-08-13 NOTE — PROGRESS NOTES
Functionality Assessment/Goals Worksheet     On a scale of 0 (Does not Interfere) to 10 (Completely Interferes)     1.  Which number describes how during the past week pain has interfered with           the following:  A.  General Activity:  8  B.  Mood: 7  C.  Walking Ability:  5  D.  Normal Work (Includes both work outside the home and housework):  8  E.  Relations with Other People:   8  F.  Sleep:   7  G.  Enjoyment of Life:   5    2.  Patient Prefers to Take their Pain Medications:     [x]  On a regular basis   []  Only when necessary    []  Does not take pain medications    3.  What are the Patient's Goals/Expectations for Visiting Pain Management?     []  Learn about my pain    [x]  Receive Medication   []  Physical Therapy     []  Treat Depression   [x]  Receive Injections    []  Treat Sleep   []  Deal with Anxiety and Stress   []  Treat Opoid Dependence/Addiction   []  Other:

## 2024-08-13 NOTE — PROGRESS NOTES
Procedure  Visit Date: 8/13/24    Wyatt Gallardo is a 39 y.o. male presents today in the office for the       History of Present Illness   Wyatt is here today for occipital nerve block.    Pre-Op Diagnosis   Bilateral Occipital neuralgia    Post-Op Diagnosis   Bilateral Occipital neuralgia    Procedure Documentation   The patient is positioned and landmarks identified. The occipital artery is palpated. Site was sprayed with Ethyl Chloride. Skin over the area was prepped with Chloro-prep.  #25-gauge 1-1/2 inch hypodermic needle was inserted through the skin so that the tip of the needle is over the bone medial to the artery. Then a mixture of 4 cc of 0.25% Marcaine with 40mg kenalog  was injected medial to the artery after negative aspiration.  This was done over the Bilateral Occipital nerve.    Vitals:    08/13/24 1156   BP: 116/80   Site: Left Upper Arm   Position: Sitting   Cuff Size: Large Adult   Weight: (!) 147.9 kg (326 lb)   Height: 1.956 m (6' 5.01\")       Conclusion   The patient tolerated the procedure well and with out complication Patient's vital signs remained stable and was discharged home in stable condition. Patient will follow up in office.    Orders Placed This Encounter   Medications    triamcinolone acetonide (KENALOG-40) injection 80 mg       Electronically signed by Rajan Leonard MD on 8/13/24 at 11:59 AM EDT

## 2024-09-17 DIAGNOSIS — G89.4 CHRONIC PAIN SYNDROME: ICD-10-CM

## 2024-09-17 DIAGNOSIS — M79.7 FIBROMYALGIA: ICD-10-CM

## 2024-09-17 DIAGNOSIS — G62.9 NEUROPATHY: ICD-10-CM

## 2024-09-17 RX ORDER — PREGABALIN 50 MG/1
50 CAPSULE ORAL 3 TIMES DAILY
Qty: 270 CAPSULE | Refills: 0 | Status: SHIPPED | OUTPATIENT
Start: 2024-09-17 | End: 2024-12-16

## 2024-09-26 ENCOUNTER — OFFICE VISIT (OUTPATIENT)
Dept: PAIN MANAGEMENT | Age: 39
End: 2024-09-26
Payer: COMMERCIAL

## 2024-09-26 VITALS
WEIGHT: 315 LBS | DIASTOLIC BLOOD PRESSURE: 70 MMHG | BODY MASS INDEX: 37.19 KG/M2 | SYSTOLIC BLOOD PRESSURE: 134 MMHG | HEIGHT: 77 IN

## 2024-09-26 DIAGNOSIS — M47.814 THORACIC SPONDYLOSIS: ICD-10-CM

## 2024-09-26 DIAGNOSIS — M54.81 BILATERAL OCCIPITAL NEURALGIA: Primary | ICD-10-CM

## 2024-09-26 DIAGNOSIS — M47.812 CERVICAL SPONDYLOSIS: ICD-10-CM

## 2024-09-26 DIAGNOSIS — M79.18 MYOFASCIAL PAIN SYNDROME: ICD-10-CM

## 2024-09-26 DIAGNOSIS — M47.816 LUMBAR SPONDYLOSIS: ICD-10-CM

## 2024-09-26 DIAGNOSIS — G62.9 NEUROPATHY: ICD-10-CM

## 2024-09-26 DIAGNOSIS — M46.1 SACROILIAC INFLAMMATION (HCC): ICD-10-CM

## 2024-09-26 DIAGNOSIS — M53.3 SACROILIAC JOINT DYSFUNCTION: ICD-10-CM

## 2024-09-26 DIAGNOSIS — M79.7 FIBROMYALGIA: ICD-10-CM

## 2024-09-26 DIAGNOSIS — G89.4 CHRONIC PAIN SYNDROME: ICD-10-CM

## 2024-09-26 PROCEDURE — G8417 CALC BMI ABV UP PARAM F/U: HCPCS | Performed by: NURSE PRACTITIONER

## 2024-09-26 PROCEDURE — 99214 OFFICE O/P EST MOD 30 MIN: CPT | Performed by: NURSE PRACTITIONER

## 2024-09-26 PROCEDURE — 1036F TOBACCO NON-USER: CPT | Performed by: NURSE PRACTITIONER

## 2024-09-26 PROCEDURE — G8427 DOCREV CUR MEDS BY ELIG CLIN: HCPCS | Performed by: NURSE PRACTITIONER

## 2024-09-26 ASSESSMENT — ENCOUNTER SYMPTOMS: BACK PAIN: 1

## 2024-12-19 ENCOUNTER — OFFICE VISIT (OUTPATIENT)
Dept: PHYSICAL MEDICINE AND REHAB | Age: 39
End: 2024-12-19
Payer: COMMERCIAL

## 2024-12-19 VITALS
BODY MASS INDEX: 37.19 KG/M2 | DIASTOLIC BLOOD PRESSURE: 74 MMHG | SYSTOLIC BLOOD PRESSURE: 122 MMHG | HEIGHT: 77 IN | WEIGHT: 315 LBS

## 2024-12-19 DIAGNOSIS — M53.3 SACROILIAC JOINT DYSFUNCTION: ICD-10-CM

## 2024-12-19 DIAGNOSIS — S73.191A TEAR OF RIGHT ACETABULAR LABRUM, INITIAL ENCOUNTER: ICD-10-CM

## 2024-12-19 DIAGNOSIS — G89.4 CHRONIC PAIN SYNDROME: ICD-10-CM

## 2024-12-19 DIAGNOSIS — M46.1 SI (SACROILIAC) JOINT INFLAMMATION (HCC): Primary | ICD-10-CM

## 2024-12-19 DIAGNOSIS — G62.9 NEUROPATHY: ICD-10-CM

## 2024-12-19 DIAGNOSIS — M54.17 LUMBOSACRAL RADICULITIS: ICD-10-CM

## 2024-12-19 DIAGNOSIS — M79.7 FIBROMYALGIA: ICD-10-CM

## 2024-12-19 DIAGNOSIS — M79.18 MYOFASCIAL PAIN SYNDROME: ICD-10-CM

## 2024-12-19 DIAGNOSIS — M47.812 CERVICAL SPONDYLOSIS: ICD-10-CM

## 2024-12-19 DIAGNOSIS — M47.816 LUMBAR SPONDYLOSIS: ICD-10-CM

## 2024-12-19 DIAGNOSIS — M54.81 BILATERAL OCCIPITAL NEURALGIA: ICD-10-CM

## 2024-12-19 DIAGNOSIS — M54.81 OCCIPITAL NEURALGIA OF RIGHT SIDE: ICD-10-CM

## 2024-12-19 PROCEDURE — G8417 CALC BMI ABV UP PARAM F/U: HCPCS | Performed by: NURSE PRACTITIONER

## 2024-12-19 PROCEDURE — 1036F TOBACCO NON-USER: CPT | Performed by: NURSE PRACTITIONER

## 2024-12-19 PROCEDURE — 99214 OFFICE O/P EST MOD 30 MIN: CPT | Performed by: NURSE PRACTITIONER

## 2024-12-19 PROCEDURE — G8427 DOCREV CUR MEDS BY ELIG CLIN: HCPCS | Performed by: NURSE PRACTITIONER

## 2024-12-19 PROCEDURE — G8484 FLU IMMUNIZE NO ADMIN: HCPCS | Performed by: NURSE PRACTITIONER

## 2024-12-19 RX ORDER — BUPROPION HYDROCHLORIDE 150 MG/1
150 TABLET ORAL EVERY MORNING
COMMUNITY

## 2024-12-19 RX ORDER — PREGABALIN 50 MG/1
50 CAPSULE ORAL 3 TIMES DAILY
Qty: 270 CAPSULE | Refills: 0 | Status: SHIPPED | OUTPATIENT
Start: 2024-12-19 | End: 2025-03-19

## 2024-12-19 ASSESSMENT — ENCOUNTER SYMPTOMS: BACK PAIN: 1

## 2024-12-19 NOTE — PROGRESS NOTES
Functionality Assessment/Goals Worksheet     On a scale of 0 (Does not Interfere) to 10 (Completely Interferes)     1.  Which number describes how during the past week pain has interfered with           the following:  A.  General Activity:  6  B.  Mood: 4  C.  Walking Ability:  6  D.  Normal Work (Includes both work outside the home and housework):  5  E.  Relations with Other People:   5  F.  Sleep:   3  G.  Enjoyment of Life:   6    2.  Patient Prefers to Take their Pain Medications:     [x]  On a regular basis   []  Only when necessary    []  Does not take pain medications    3.  What are the Patient's Goals/Expectations for Visiting Pain Management?     []  Learn about my pain    [x]  Receive Medication   []  Physical Therapy     []  Treat Depression   [x]  Receive Injections    []  Treat Sleep   []  Deal with Anxiety and Stress   []  Treat Opoid Dependence/Addiction   []  Other:                                
:repeat  Right SI ADVANCED  injection with IV sedation under Fluoroscopy F/U Jaylin   Discussed with patient about risks with procedure including infection, reaction to medication, increased pain, or bleeding.  Medications:I will take over Trazodone but lower to 25 mg . Continue Cymbalta 60   Lyrica 50 mg  TID  uses medical marijuana   If patient is on blood thinners will need approval to hold: yes or no:  Does patient have implanted device Stimulator, AICD or Pacemaker etc?   Referral to Dr Luke to rule out Thoracic Outlet Syndrome consult reviewed  FCE reviewed  March 27,2024   Discussed to talk to PCP regarding lower legs reddened appears to have some  vascular issues needs workup  RIGHT HIP MRI to rule out labral tear has been going to chiropractor and water therapy 3 times per week not much relief    Meds. Prescribed:   Orders Placed This Encounter   Medications    pregabalin (LYRICA) 50 MG capsule     Sig: Take 1 capsule by mouth 3 times daily for 90 days. Max Daily Amount: 150 mg     Dispense:  270 capsule     Refill:  0       Return for repeat  Right SI ADVANCED  injection with IV sedation under Fluoroscopy F/U Jaylin .         Electronically signed by SHILPA Stovall CNP on 12/19/2024 at 11:40 AM

## 2025-01-14 ENCOUNTER — TELEPHONE (OUTPATIENT)
Dept: PHYSICAL MEDICINE AND REHAB | Age: 40
End: 2025-01-14

## 2025-01-14 DIAGNOSIS — S73.191A TEAR OF RIGHT ACETABULAR LABRUM, INITIAL ENCOUNTER: Primary | ICD-10-CM

## 2025-01-14 DIAGNOSIS — M16.11 PRIMARY OSTEOARTHRITIS OF RIGHT HIP: ICD-10-CM

## 2025-01-14 NOTE — TELEPHONE ENCOUNTER
Per  [facility] Scheduling Order placed for MRI RIGHT HIP with and without contrast and with arthrogram needs changed to right hip mri with contrast only and add still with arthrogram.

## 2025-01-16 ENCOUNTER — HOSPITAL ENCOUNTER (OUTPATIENT)
Dept: GENERAL RADIOLOGY | Age: 40
Discharge: HOME | End: 2025-01-16
Payer: COMMERCIAL

## 2025-01-16 ENCOUNTER — HOSPITAL ENCOUNTER (OUTPATIENT)
Dept: MRI IMAGING | Age: 40
Discharge: HOME | End: 2025-01-16
Payer: COMMERCIAL

## 2025-01-16 DIAGNOSIS — S73.191A TEAR OF RIGHT ACETABULAR LABRUM, INITIAL ENCOUNTER: ICD-10-CM

## 2025-01-16 DIAGNOSIS — M16.11 PRIMARY OSTEOARTHRITIS OF RIGHT HIP: ICD-10-CM

## 2025-01-16 PROCEDURE — 77002 NEEDLE LOCALIZATION BY XRAY: CPT

## 2025-01-16 PROCEDURE — 6360000004 HC RX CONTRAST MEDICATION: Performed by: NURSE PRACTITIONER

## 2025-01-16 PROCEDURE — A9579 GAD-BASE MR CONTRAST NOS,1ML: HCPCS | Performed by: NURSE PRACTITIONER

## 2025-01-16 PROCEDURE — 73722 MRI JOINT OF LWR EXTR W/DYE: CPT

## 2025-01-16 RX ADMIN — GADOTERIDOL 1 ML: 279.3 INJECTION, SOLUTION INTRAVENOUS at 11:18

## 2025-01-16 RX ADMIN — IOHEXOL 10 ML: 240 INJECTION, SOLUTION INTRATHECAL; INTRAVASCULAR; INTRAVENOUS; ORAL at 10:38

## 2025-01-29 DIAGNOSIS — M76.891 RIGHT HIP TENDONITIS: ICD-10-CM

## 2025-01-29 DIAGNOSIS — M70.61 TROCHANTERIC BURSITIS OF RIGHT HIP: ICD-10-CM

## 2025-01-29 DIAGNOSIS — S73.191A TEAR OF RIGHT ACETABULAR LABRUM, INITIAL ENCOUNTER: Primary | ICD-10-CM

## 2025-02-05 ENCOUNTER — HOSPITAL ENCOUNTER (OUTPATIENT)
Dept: PHYSICAL THERAPY | Age: 40
Setting detail: THERAPIES SERIES
Discharge: HOME | End: 2025-02-05
Payer: COMMERCIAL

## 2025-02-05 PROCEDURE — 97110 THERAPEUTIC EXERCISES: CPT

## 2025-02-05 PROCEDURE — 97162 PT EVAL MOD COMPLEX 30 MIN: CPT

## 2025-02-05 NOTE — PROGRESS NOTES
[facility]  PHYSICAL THERAPY  [x] EVALUATION  [] DAILY NOTE (LAND) [] DAILY NOTE (AQUATIC ) [] PROGRESS NOTE [] DISCHARGE NOTE    [x] OUTPATIENT REHABILITATION CENTER [facility]   [] [place] [facility] [place]    [] [facility]   [] [facility]    Date: 2025  Patient Name:  Wyatt Gallardo  : 1985  MRN: [id number]  CSN: [id number]    Referring Practitioner  Adelia ANG, APRN - CNP [id number]      Diagnosis  Diagnoses       S73.191A (ICD-10-CM) - Other sprain of right hip, initial encounter    M70.61 (ICD-10-CM) - Trochanteric bursitis, right hip    M76.891 (ICD-10-CM) - Other specified enthesopathies of right lower limb, excluding foot           Treatment Diagnosis M25.551  Right Hip Pain  M25,651 Stiffness of right hip, not elsewhere classified  R53.1 Weakness   Date of Evaluation 25   Additional Pertinent History Wyatt Gallardo has a past medical history of Diabetes mellitus (HCC), Hypertension, and Insomnia.  he has a past surgical history that includes FL SHOULDER ARTHROGRAM RIGHT S&I (Left, ); Carpal tunnel release (Bilateral, ); Cervical disc surgery (); Tonsillectomy and adenoidectomy (); Facet joint injection (Bilateral, 2024); Facet joint injection (Bilateral, 3/6/2024); Pain management procedure (Bilateral, 4/3/2024); Back Injection (Right, 2024); and Nerve Block (Right, 2024).     Allergies Allergies   Allergen Reactions    Pcn [Penicillins] Rash      Medications   Current Outpatient Medications:     buPROPion (WELLBUTRIN XL) 150 MG extended release tablet, Take 1 tablet by mouth every morning, Disp: , Rfl:     pregabalin (LYRICA) 50 MG capsule, Take 1 capsule by mouth 3 times daily for 90 days. Max Daily Amount: 150 mg, Disp: 270 capsule, Rfl: 0    traZODone (DESYREL) 50 MG tablet, Take 0.5 tablets by mouth nightly, Disp: , Rfl:     DULoxetine (CYMBALTA) 60 MG extended release capsule, Take 1 capsule by mouth daily,

## 2025-02-07 ENCOUNTER — TELEPHONE (OUTPATIENT)
Dept: PHYSICAL MEDICINE AND REHAB | Age: 40
End: 2025-02-07

## 2025-02-07 NOTE — TELEPHONE ENCOUNTER
Pt called and got an estimate on his bill for the Sacroiliac adv. Injection to be done this tues. Says it is a lot higher than last year and wants confirmation on the bill. Can you please give me the CPT code so can give to him before calling [employer] to get confirmation

## 2025-02-10 ENCOUNTER — HOSPITAL ENCOUNTER (OUTPATIENT)
Dept: PHYSICAL THERAPY | Age: 40
Setting detail: THERAPIES SERIES
Discharge: HOME | End: 2025-02-10
Payer: COMMERCIAL

## 2025-02-10 PROCEDURE — 97110 THERAPEUTIC EXERCISES: CPT

## 2025-02-10 NOTE — PROGRESS NOTES
[facility]  PHYSICAL THERAPY  [] EVALUATION  [x] DAILY NOTE (LAND) [] DAILY NOTE (AQUATIC ) [] PROGRESS NOTE [] DISCHARGE NOTE    [x] OUTPATIENT REHABILITATION CENTER [facility]   [] [place] [facility] [place]    [] [facility]   [] [facility]    Date: 2/10/2025  Patient Name:  Wyatt Gallardo  : 1985  MRN: [id number]  CSN: [id number]    Referring Practitioner Adelia Pérez, APRN - CNP [id number]      Diagnosis  Diagnoses       S73.191A (ICD-10-CM) - Other sprain of right hip, initial encounter    M70.61 (ICD-10-CM) - Trochanteric bursitis, right hip    M76.891 (ICD-10-CM) - Other specified enthesopathies of right lower limb, excluding foot           Treatment Diagnosis M25.551  Right Hip Pain  M25,651 Stiffness of right hip, not elsewhere classified  R53.1 Weakness   Date of Evaluation 25   Additional Pertinent History Wyatt Gallardo has a past medical history of Diabetes mellitus (HCC), Hypertension, and Insomnia.  he has a past surgical history that includes FL SHOULDER ARTHROGRAM RIGHT S&I (Left, ); Carpal tunnel release (Bilateral, ); Cervical disc surgery (); Tonsillectomy and adenoidectomy (); Facet joint injection (Bilateral, 2024); Facet joint injection (Bilateral, 3/6/2024); Pain management procedure (Bilateral, 4/3/2024); Back Injection (Right, 2024); and Nerve Block (Right, 2024).     Allergies Allergies   Allergen Reactions    Pcn [Penicillins] Rash      Medications   Current Outpatient Medications:     buPROPion (WELLBUTRIN XL) 150 MG extended release tablet, Take 1 tablet by mouth every morning, Disp: , Rfl:     pregabalin (LYRICA) 50 MG capsule, Take 1 capsule by mouth 3 times daily for 90 days. Max Daily Amount: 150 mg, Disp: 270 capsule, Rfl: 0    traZODone (DESYREL) 50 MG tablet, Take 0.5 tablets by mouth nightly, Disp: , Rfl:     DULoxetine (CYMBALTA) 60 MG extended release capsule, Take 1 capsule by mouth daily,

## 2025-02-12 ENCOUNTER — HOSPITAL ENCOUNTER (OUTPATIENT)
Dept: PHYSICAL THERAPY | Age: 40
Setting detail: THERAPIES SERIES
Discharge: HOME | End: 2025-02-12
Payer: COMMERCIAL

## 2025-02-12 PROCEDURE — 97110 THERAPEUTIC EXERCISES: CPT

## 2025-02-12 ASSESSMENT — ENCOUNTER SYMPTOMS: BACK PAIN: 1

## 2025-02-12 NOTE — PROGRESS NOTES
[facility]  PHYSICAL THERAPY  [] EVALUATION  [x] DAILY NOTE (LAND) [] DAILY NOTE (AQUATIC ) [] PROGRESS NOTE [] DISCHARGE NOTE    [x] OUTPATIENT REHABILITATION CENTER [facility]   [] [place] [facility] [place]    [] [facility]   [] [facility]    Date: 2025  Patient Name:  Wyatt Gallardo  : 1985  MRN: [id number]  CSN: [id number]    Referring Practitioner  Adelia ANG, APRN - CNP [id number]      Diagnosis  Diagnoses       S73.191A (ICD-10-CM) - Other sprain of right hip, initial encounter    M70.61 (ICD-10-CM) - Trochanteric bursitis, right hip    M76.891 (ICD-10-CM) - Other specified enthesopathies of right lower limb, excluding foot           Treatment Diagnosis M25.551  Right Hip Pain  M25,651 Stiffness of right hip, not elsewhere classified  R53.1 Weakness   Date of Evaluation 25   Additional Pertinent History Wyatt Gallardo has a past medical history of Diabetes mellitus (HCC), Hypertension, and Insomnia.  he has a past surgical history that includes FL SHOULDER ARTHROGRAM RIGHT S&I (Left, ); Carpal tunnel release (Bilateral, ); Cervical disc surgery (); Tonsillectomy and adenoidectomy (); Facet joint injection (Bilateral, 2024); Facet joint injection (Bilateral, 3/6/2024); Pain management procedure (Bilateral, 4/3/2024); Back Injection (Right, 2024); and Nerve Block (Right, 2024).     Allergies Allergies   Allergen Reactions    Pcn [Penicillins] Rash      Medications   Current Outpatient Medications:     buPROPion (WELLBUTRIN XL) 150 MG extended release tablet, Take 1 tablet by mouth every morning, Disp: , Rfl:     pregabalin (LYRICA) 50 MG capsule, Take 1 capsule by mouth 3 times daily for 90 days. Max Daily Amount: 150 mg, Disp: 270 capsule, Rfl: 0    traZODone (DESYREL) 50 MG tablet, Take 0.5 tablets by mouth nightly, Disp: , Rfl:     DULoxetine (CYMBALTA) 60 MG extended release capsule, Take 1 capsule by mouth daily,

## 2025-02-12 NOTE — H&P
Today, patient presents for planned RIGHT advanced SI injection    This note is reflective of the patient's previous visit for evaluation. We will proceed with today's planned procedure. Since patient's last visit for evaluation, there have been no interval changes in medical history. Patient has no new numbness, weakness, or focal neurological deficit since evaluation. Patient has no contraindications to injection (no anticoagulation or recent antibiotic intake for active infections), and has a  present or is able to drive themselves (as discussed and cleared by physician).  Allergies to latex, contrast dye, and steroid medications have been confirmed with the patient prior to the procedure.  NPO necessity has been assessed and accepted based on procedure complexity. The risks and benefits of the procedure have been explained including but are not limited to infection, bleeding, paralysis, immediate post procedure weakness, and dizziness; the patient acknowledges understanding and desires to proceed with the procedure. Patient has signed consent for same procedure as discussed in previous clinic encounter. All other questions and concerns were addressed at bedside. See procedure note for full details.       Post procedure Instructions: The patient was advised not to drive during the day of the procedure and not to engage in any significant decision making (unless otherwise states by physician). The patient was also advised to be cautious with walking/activity for 24 hours following today's visit and asked not to engage in over-exertion (unless otherwise states by physician). After this time, it is ok to resume pre-procedure level of activity. Patient advised to apply ice to site of injection in situations of pain and discomfort. Patient advised to not submerge site of injection during bath or pool activities for approximately 24 hours post-procedure. Patient attested to understanding post procedure directions

## 2025-02-13 ENCOUNTER — APPOINTMENT (OUTPATIENT)
Dept: GENERAL RADIOLOGY | Age: 40
End: 2025-02-13
Attending: ANESTHESIOLOGY
Payer: COMMERCIAL

## 2025-02-13 ENCOUNTER — HOSPITAL ENCOUNTER (OUTPATIENT)
Age: 40
Setting detail: OUTPATIENT SURGERY
Discharge: HOME | End: 2025-02-13
Attending: ANESTHESIOLOGY | Admitting: ANESTHESIOLOGY
Payer: COMMERCIAL

## 2025-02-13 VITALS
OXYGEN SATURATION: 92 % | WEIGHT: 315 LBS | HEIGHT: 76 IN | TEMPERATURE: 98 F | HEART RATE: 62 BPM | BODY MASS INDEX: 38.36 KG/M2 | DIASTOLIC BLOOD PRESSURE: 65 MMHG | SYSTOLIC BLOOD PRESSURE: 117 MMHG | RESPIRATION RATE: 14 BRPM

## 2025-02-13 PROCEDURE — 7100000011 HC PHASE II RECOVERY - ADDTL 15 MIN: Performed by: ANESTHESIOLOGY

## 2025-02-13 PROCEDURE — 6360000002 HC RX W HCPCS: Performed by: ANESTHESIOLOGY

## 2025-02-13 PROCEDURE — 2709999900 HC NON-CHARGEABLE SUPPLY: Performed by: ANESTHESIOLOGY

## 2025-02-13 PROCEDURE — 99152 MOD SED SAME PHYS/QHP 5/>YRS: CPT | Performed by: ANESTHESIOLOGY

## 2025-02-13 PROCEDURE — 64451 NJX AA&/STRD NRV NRVTG SI JT: CPT | Performed by: ANESTHESIOLOGY

## 2025-02-13 PROCEDURE — 7100000010 HC PHASE II RECOVERY - FIRST 15 MIN: Performed by: ANESTHESIOLOGY

## 2025-02-13 PROCEDURE — 3600000054 HC PAIN LEVEL 3 BASE: Performed by: ANESTHESIOLOGY

## 2025-02-13 RX ORDER — MIDAZOLAM HYDROCHLORIDE 1 MG/ML
INJECTION, SOLUTION INTRAMUSCULAR; INTRAVENOUS PRN
Status: DISCONTINUED | OUTPATIENT
Start: 2025-02-13 | End: 2025-02-13 | Stop reason: ALTCHOICE

## 2025-02-13 RX ORDER — LIDOCAINE HYDROCHLORIDE 10 MG/ML
INJECTION, SOLUTION EPIDURAL; INFILTRATION; INTRACAUDAL; PERINEURAL PRN
Status: DISCONTINUED | OUTPATIENT
Start: 2025-02-13 | End: 2025-02-13 | Stop reason: ALTCHOICE

## 2025-02-13 RX ORDER — BUPIVACAINE HYDROCHLORIDE 5 MG/ML
INJECTION, SOLUTION PERINEURAL PRN
Status: DISCONTINUED | OUTPATIENT
Start: 2025-02-13 | End: 2025-02-13 | Stop reason: ALTCHOICE

## 2025-02-13 RX ORDER — FENTANYL CITRATE 50 UG/ML
INJECTION, SOLUTION INTRAMUSCULAR; INTRAVENOUS PRN
Status: DISCONTINUED | OUTPATIENT
Start: 2025-02-13 | End: 2025-02-13 | Stop reason: ALTCHOICE

## 2025-02-13 RX ORDER — METHYLPREDNISOLONE ACETATE 80 MG/ML
INJECTION, SUSPENSION INTRA-ARTICULAR; INTRALESIONAL; INTRAMUSCULAR; SOFT TISSUE PRN
Status: DISCONTINUED | OUTPATIENT
Start: 2025-02-13 | End: 2025-02-13 | Stop reason: ALTCHOICE

## 2025-02-13 RX ORDER — MELOXICAM 15 MG/1
15 TABLET ORAL DAILY
COMMUNITY

## 2025-02-13 ASSESSMENT — PAIN - FUNCTIONAL ASSESSMENT
PAIN_FUNCTIONAL_ASSESSMENT: 0-10
PAIN_FUNCTIONAL_ASSESSMENT: 0-10
PAIN_FUNCTIONAL_ASSESSMENT: ACTIVITIES ARE NOT PREVENTED

## 2025-02-13 ASSESSMENT — PAIN DESCRIPTION - DESCRIPTORS: DESCRIPTORS: ACHING

## 2025-02-13 NOTE — PROCEDURES
Pre-operative Diagnosis:  SI joint pain     Post-operative Diagnosis:  SI joint pain     Procedure: RIGHT SI joint block     Procedure Description:  After having signed the informed consent, the patient was placed in the prone position.  The patient's low back and buttocks was prepped with chloraprep solution, and draped in a sterile fashion. A total of 1 ml of 1% lidocaine was used to anesthetize the skin and underlying tissues at each level. Next, 3.5 inch 22 g spinal needles were advanced to the anatomic location of the L5 primary dorsal ramus at the junction of the superior articular process and sacral ala utilizing intermittent fluoroscopy, as well as the S1, S2, and S3 lateral branch nerves at the lateral border of the S1, S2, and S3 posterior/dorsal foramen. Then, a 1 cc mixture of 80 mg depo-medrol and 0.25% bupivacaine was injected at each site. The needles were removed without any complications. The patient tolerated the procedure well, was transported to the recovery room and observed for 15 minutes and discharged in an ambulatory fashion. No immediate reported complications.     Procedural Complications: None  Estimated Blood Loss: 0 mL           IV sedation was used during the procedure:  - Moderate intravenous conscious sedation was supervised by Dr. Lee  - The patient was independently monitored by a Registered Nurse assigned to the procedure room  - Monitoring included automated blood pressure, continuous EKG, and continuous pulse oximetry  - The detailed conscious record is permanently stored in the Hospital Information System  - The following is the conscious sedation record:  Start Time: 10:47  End Time : 11:02  Duration: 15 minutes   Medications Administered: 2 mg Versed, 100 mcg Fentanyl        Jack Lee DO  Interventional Pain Management/PM&R   [facility] and [facility]

## 2025-02-13 NOTE — PRE SEDATION
[facility]  Pre-Sedation/Analgesia History & Physical    Pt Name: Wyatt Gallardo  MRN: [id number]  YOB: 1985  Provider Performing Procedure: Jack Lee DO   Primary Care Physician: Jordyn Henley MD      MEDICAL HISTORY       has a past medical history of Diabetes mellitus (HCC), Hypertension, and Insomnia.  SURGICAL HISTORY   has a past surgical history that includes FL SHOULDER ARTHROGRAM RIGHT S&I (Left, 2005); Carpal tunnel release (Bilateral, 2020); Cervical disc surgery (2021); Tonsillectomy and adenoidectomy (1988); Facet joint injection (Bilateral, 1/25/2024); Facet joint injection (Bilateral, 3/6/2024); Pain management procedure (Bilateral, 4/3/2024); Back Injection (Right, 5/14/2024); Nerve Block (Right, 6/18/2024); and Pain management procedure (Right, 2/13/2025).    ALLERGIES   Allergies as of 12/19/2024 - Fully Reviewed 12/19/2024   Allergen Reaction Noted    Pcn [penicillins] Rash 01/10/2024       MEDICATIONS   Prior to Admission medications    Medication Sig Start Date End Date Taking? Authorizing Provider   meloxicam (MOBIC) 15 MG tablet Take 1 tablet by mouth daily   Yes ProviderGuillermina MD   buPROPion (WELLBUTRIN XL) 150 MG extended release tablet Take 1 tablet by mouth every morning   Yes Provider, MD Guillermina   pregabalin (LYRICA) 50 MG capsule Take 1 capsule by mouth 3 times daily for 90 days. Max Daily Amount: 150 mg 12/19/24 3/19/25 Yes Adelia Pérez APRN - CNP   traZODone (DESYREL) 50 MG tablet Take 0.5 tablets by mouth nightly   Yes Provider, MD Guillermina   DULoxetine (CYMBALTA) 60 MG extended release capsule Take 1 capsule by mouth daily   Yes ProviderGuillermina MD   lisinopril-hydroCHLOROthiazide (PRINZIDE;ZESTORETIC) 20-12.5 MG per tablet Take 1 tablet by mouth daily   Yes Provider, MD Guillermina   allopurinol (ZYLOPRIM) 100 MG tablet Take 1 tablet by mouth daily   Yes Provider, MD Guillermina     PHYSICAL:   Vitals:    02/13/25 1053

## 2025-02-13 NOTE — POST SEDATION
[facility] [place]  Sedation/Analgesia Post Sedation Record    Pt Name: Wyatt Gallardo  MRN: [id number]  YOB: 1985  Procedure Performed By: Jack Lee DO  Primary Care Physician: Jordyn Henley MD    POST-PROCEDURE    Physicians/Assistants: Jack Lee DO  Procedure Performed: See Procedure Note   Sedation/Anesthesia: Versed and Fentanyl (See procedure note for amount and duration)  Estimated Blood Loss:     0  ml  Specimens Removed: None        Complications: None           Jakc Lee DO  Electronically signed 2/13/2025 at 1:57 PM

## 2025-02-13 NOTE — PROGRESS NOTES
1053-Patient to Phase II. Report received from surgical RN. Patient awake and alert. Vital signs obtained and stable. Respirations unlabored on room air. Patient denies pain and nausea. Denies numbness and tingling in extremities. Injection site open to air and no drainage noted. Patient instructed to stay in bed. Call light in reach.     1055-snack and drink provided    1115-IV removed. Pt getting dressed. Ride notified.     1120-Pt discharged home in stable condition. Walked out with staff and all belongings sent.

## 2025-02-18 ENCOUNTER — HOSPITAL ENCOUNTER (OUTPATIENT)
Dept: PHYSICAL THERAPY | Age: 40
Setting detail: THERAPIES SERIES
Discharge: HOME | End: 2025-02-18
Payer: COMMERCIAL

## 2025-02-18 PROCEDURE — 97110 THERAPEUTIC EXERCISES: CPT

## 2025-02-18 NOTE — PROGRESS NOTES
[facility]  PHYSICAL THERAPY  [] EVALUATION  [x] DAILY NOTE (LAND) [] DAILY NOTE (AQUATIC ) [] PROGRESS NOTE [] DISCHARGE NOTE    [x] OUTPATIENT REHABILITATION CENTER [facility]   [] [place] [facility] [place]    [] [facility]   [] [facility]    Date: 2025  Patient Name:  Wyatt Gallardo  : 1985  MRN: [id number]  CSN: [id number]    Referring Practitioner  Adelia ANGSHILPA - CNP [phone]      Diagnosis  Diagnoses       S73.191A (ICD-10-CM) - Other sprain of right hip, initial encounter    M70.61 (ICD-10-CM) - Trochanteric bursitis, right hip    M76.891 (ICD-10-CM) - Other specified enthesopathies of right lower limb, excluding foot           Treatment Diagnosis M25.551  Right Hip Pain  M25,651 Stiffness of right hip, not elsewhere classified  R53.1 Weakness   Date of Evaluation 25   Additional Pertinent History Wyatt Gallardo has a past medical history of Diabetes mellitus (HCC), Hypertension, and Insomnia.  he has a past surgical history that includes FL SHOULDER ARTHROGRAM RIGHT S&I (Left, ); Carpal tunnel release (Bilateral, ); Cervical disc surgery (); Tonsillectomy and adenoidectomy (); Facet joint injection (Bilateral, 2024); Facet joint injection (Bilateral, 3/6/2024); Pain management procedure (Bilateral, 4/3/2024); Back Injection (Right, 2024); Nerve Block (Right, 2024); and Pain management procedure (Right, 2025).     Allergies Allergies   Allergen Reactions    Pcn [Penicillins] Rash      Medications   Current Outpatient Medications:     meloxicam (MOBIC) 15 MG tablet, Take 1 tablet by mouth daily, Disp: , Rfl:     buPROPion (WELLBUTRIN XL) 150 MG extended release tablet, Take 1 tablet by mouth every morning, Disp: , Rfl:     pregabalin (LYRICA) 50 MG capsule, Take 1 capsule by mouth 3 times daily for 90 days. Max Daily Amount: 150 mg, Disp: 270 capsule, Rfl: 0    traZODone (DESYREL) 50 MG tablet, Take 0.5

## 2025-02-20 ENCOUNTER — HOSPITAL ENCOUNTER (OUTPATIENT)
Dept: PHYSICAL THERAPY | Age: 40
Setting detail: THERAPIES SERIES
Discharge: HOME | End: 2025-02-20
Payer: COMMERCIAL

## 2025-02-20 PROCEDURE — 97110 THERAPEUTIC EXERCISES: CPT

## 2025-02-20 NOTE — PROGRESS NOTES
tablets by mouth nightly, Disp: , Rfl:     DULoxetine (CYMBALTA) 60 MG extended release capsule, Take 1 capsule by mouth daily, Disp: , Rfl:     lisinopril-hydroCHLOROthiazide (PRINZIDE;ZESTORETIC) 20-12.5 MG per tablet, Take 1 tablet by mouth daily, Disp: , Rfl:     allopurinol (ZYLOPRIM) 100 MG tablet, Take 1 tablet by mouth daily, Disp: , Rfl:       Functional Outcome Measure Used LEFS   Functional Outcome Score 33/80 (2/5/25)       Insurance: Primary: Payor: [employer] /  /  / ,   Secondary:    Authorization Information PRE CERTIFICATION REQUIRED: Additional authorization not required.  INSURANCE THERAPY BENEFIT: Allowed 20 visits of OT, PT, and ST EACH per calendar year.  0 visits have been used.. Hard Max.  AQUATIC THERAPY COVERED: Yes  MODALITIES COVERED:  Yes   Initial CPT Codes Requested 14992 - Therapeutic Exercise, 95762 - Manual Therapy , 76876 - Aquatic Therapeutic Exercise, 59173 - Ultrasound , and 41310 - Electrical Stimulation Unattended   Progress Note Counter 5/10 for progress note (Reporting Period: 2/5/25 to     )   Recertification Date 04/02/25   Physician Follow-Up 2/6/25 ortho, 3/6/25 pain management    Physician Orders    History of Present Illness Patient reports that he has had R hip pain for about 6 months now. Patient reports that he had an injection in R hip about 6 months ago. Patient reports that pain returned and he had hip MRI completed at that time. Patient denies any injury to R hip. Patient reports that MRI showed R hip labral tear. Patient reports history of fibromyalgia. Patient has an apt with ortho on 2/6/25. Patient has hip injection scheduled for 2/13/25. Patient reports increased pain with walking, moving R hip, laying, donning R shoe with leg crossed. Patient reports increased pain with taking a longer stride on R LE. Patient reports some numbness/tingling into R LE, can go down to foot.     R hip MRI:  1. Contrast is seen within the superior acetabular labrum

## 2025-02-25 ENCOUNTER — HOSPITAL ENCOUNTER (OUTPATIENT)
Dept: PHYSICAL THERAPY | Age: 40
Setting detail: THERAPIES SERIES
Discharge: HOME | End: 2025-02-25
Payer: COMMERCIAL

## 2025-02-25 PROCEDURE — 97110 THERAPEUTIC EXERCISES: CPT

## 2025-02-25 NOTE — PROGRESS NOTES
[facility]  PHYSICAL THERAPY  [] EVALUATION  [x] DAILY NOTE (LAND) [] DAILY NOTE (AQUATIC ) [] PROGRESS NOTE [] DISCHARGE NOTE    [x] OUTPATIENT REHABILITATION CENTER [facility]   [] [facility] [place]    [] [facility]   [] [facility]    Date: 2025  Patient Name:  Wyatt Gallardo  : 1985  MRN: [id number]  CSN: [id number]    Referring Practitioner  Adelia ANG, APRN - CNP [phone]      Diagnosis  Diagnoses       S73.191A (ICD-10-CM) - Other sprain of right hip, initial encounter    M70.61 (ICD-10-CM) - Trochanteric bursitis, right hip    M76.891 (ICD-10-CM) - Other specified enthesopathies of right lower limb, excluding foot           Treatment Diagnosis M25.551  Right Hip Pain  M25,651 Stiffness of right hip, not elsewhere classified  R53.1 Weakness   Date of Evaluation 25   Additional Pertinent History Wyatt Gallardo has a past medical history of Diabetes mellitus (HCC), Hypertension, and Insomnia.  he has a past surgical history that includes FL SHOULDER ARTHROGRAM RIGHT S&I (Left, ); Carpal tunnel release (Bilateral, ); Cervical disc surgery (); Tonsillectomy and adenoidectomy (); Facet joint injection (Bilateral, 2024); Facet joint injection (Bilateral, 3/6/2024); Pain management procedure (Bilateral, 4/3/2024); Back Injection (Right, 2024); Nerve Block (Right, 2024); and Pain management procedure (Right, 2025).     Allergies Allergies   Allergen Reactions    Pcn [Penicillins] Rash      Medications   Current Outpatient Medications:     meloxicam (MOBIC) 15 MG tablet, Take 1 tablet by mouth daily, Disp: , Rfl:     buPROPion (WELLBUTRIN XL) 150 MG extended release tablet, Take 1 tablet by mouth every morning, Disp: , Rfl:     pregabalin (LYRICA) 50 MG capsule, Take 1 capsule by mouth 3 times daily for 90 days. Max Daily Amount: 150 mg, Disp: 270 capsule, Rfl: 0    traZODone (DESYREL) 50 MG tablet, Take 0.5

## 2025-02-27 ENCOUNTER — HOSPITAL ENCOUNTER (OUTPATIENT)
Dept: PHYSICAL THERAPY | Age: 40
Setting detail: THERAPIES SERIES
Discharge: HOME | End: 2025-02-27
Payer: COMMERCIAL

## 2025-02-27 PROCEDURE — 97110 THERAPEUTIC EXERCISES: CPT

## 2025-02-27 NOTE — PROGRESS NOTES
[facility]  PHYSICAL THERAPY  [] EVALUATION  [x] DAILY NOTE (LAND) [] DAILY NOTE (AQUATIC ) [] PROGRESS NOTE [] DISCHARGE NOTE    [x] OUTPATIENT REHABILITATION CENTER [facility]   [] [place] [facility] [place]    [] [facility]   [] [facility]    Date: 2025  Patient Name:  Wyatt Gallardo  : 1985  MRN: [id number]  CSN: [id number]    Referring Practitioner  Adelia ANG, APRN - CNP [phone]      Diagnosis  Diagnoses       S73.191A (ICD-10-CM) - Other sprain of right hip, initial encounter    M70.61 (ICD-10-CM) - Trochanteric bursitis, right hip    M76.891 (ICD-10-CM) - Other specified enthesopathies of right lower limb, excluding foot           Treatment Diagnosis M25.551  Right Hip Pain  M25,651 Stiffness of right hip, not elsewhere classified  R53.1 Weakness   Date of Evaluation 25   Additional Pertinent History Wyatt Gallardo has a past medical history of Diabetes mellitus (HCC), Hypertension, and Insomnia.  he has a past surgical history that includes FL SHOULDER ARTHROGRAM RIGHT S&I (Left, ); Carpal tunnel release (Bilateral, ); Cervical disc surgery (); Tonsillectomy and adenoidectomy (); Facet joint injection (Bilateral, 2024); Facet joint injection (Bilateral, 3/6/2024); Pain management procedure (Bilateral, 4/3/2024); Back Injection (Right, 2024); Nerve Block (Right, 2024); and Pain management procedure (Right, 2025).     Allergies Allergies   Allergen Reactions    Pcn [Penicillins] Rash      Medications   Current Outpatient Medications:     meloxicam (MOBIC) 15 MG tablet, Take 1 tablet by mouth daily, Disp: , Rfl:     buPROPion (WELLBUTRIN XL) 150 MG extended release tablet, Take 1 tablet by mouth every morning, Disp: , Rfl:     pregabalin (LYRICA) 50 MG capsule, Take 1 capsule by mouth 3 times daily for 90 days. Max Daily Amount: 150 mg, Disp: 270 capsule, Rfl: 0    traZODone (DESYREL) 50 MG tablet, Take 0.5

## 2025-03-04 ENCOUNTER — HOSPITAL ENCOUNTER (OUTPATIENT)
Dept: PHYSICAL THERAPY | Age: 40
Setting detail: THERAPIES SERIES
Discharge: HOME | End: 2025-03-04
Payer: COMMERCIAL

## 2025-03-04 PROCEDURE — 97110 THERAPEUTIC EXERCISES: CPT

## 2025-03-04 NOTE — PROGRESS NOTES
[]  Hold pending physician visit  []  Discharge    Time In 1117   Time Out 1200   Timed Code Minutes: 43 min   Total Treatment Time: 43 min       Electronically Signed by: Farzana Abreu PT

## 2025-03-06 ENCOUNTER — HOSPITAL ENCOUNTER (OUTPATIENT)
Dept: PHYSICAL THERAPY | Age: 40
Setting detail: THERAPIES SERIES
Discharge: HOME | End: 2025-03-06
Payer: COMMERCIAL

## 2025-03-06 ENCOUNTER — OFFICE VISIT (OUTPATIENT)
Dept: PHYSICAL MEDICINE AND REHAB | Age: 40
End: 2025-03-06
Payer: COMMERCIAL

## 2025-03-06 VITALS
DIASTOLIC BLOOD PRESSURE: 68 MMHG | SYSTOLIC BLOOD PRESSURE: 116 MMHG | BODY MASS INDEX: 38.36 KG/M2 | WEIGHT: 315 LBS | HEIGHT: 76 IN

## 2025-03-06 DIAGNOSIS — M54.17 LUMBOSACRAL RADICULITIS: ICD-10-CM

## 2025-03-06 DIAGNOSIS — M54.81 BILATERAL OCCIPITAL NEURALGIA: ICD-10-CM

## 2025-03-06 DIAGNOSIS — M54.81 OCCIPITAL NEURALGIA OF RIGHT SIDE: ICD-10-CM

## 2025-03-06 DIAGNOSIS — M79.18 MYOFASCIAL PAIN SYNDROME: ICD-10-CM

## 2025-03-06 DIAGNOSIS — M47.816 LUMBAR SPONDYLOSIS: ICD-10-CM

## 2025-03-06 DIAGNOSIS — M53.3 SACROILIAC JOINT DYSFUNCTION: ICD-10-CM

## 2025-03-06 DIAGNOSIS — M47.812 CERVICAL SPONDYLOSIS: ICD-10-CM

## 2025-03-06 DIAGNOSIS — S73.191A TEAR OF RIGHT ACETABULAR LABRUM, INITIAL ENCOUNTER: ICD-10-CM

## 2025-03-06 DIAGNOSIS — M79.7 FIBROMYALGIA: ICD-10-CM

## 2025-03-06 DIAGNOSIS — G62.9 NEUROPATHY: ICD-10-CM

## 2025-03-06 DIAGNOSIS — M46.1 SI (SACROILIAC) JOINT INFLAMMATION: Primary | ICD-10-CM

## 2025-03-06 DIAGNOSIS — G89.4 CHRONIC PAIN SYNDROME: ICD-10-CM

## 2025-03-06 PROCEDURE — 99214 OFFICE O/P EST MOD 30 MIN: CPT | Performed by: NURSE PRACTITIONER

## 2025-03-06 PROCEDURE — 97110 THERAPEUTIC EXERCISES: CPT

## 2025-03-06 RX ORDER — MELOXICAM 15 MG/1
15 TABLET ORAL DAILY PRN
Qty: 90 TABLET | Refills: 0 | Status: SHIPPED | OUTPATIENT
Start: 2025-03-06 | End: 2025-06-04

## 2025-03-06 RX ORDER — PREGABALIN 50 MG/1
50 CAPSULE ORAL 3 TIMES DAILY
Qty: 270 CAPSULE | Refills: 0 | Status: SHIPPED | OUTPATIENT
Start: 2025-03-06 | End: 2025-06-04

## 2025-03-06 ASSESSMENT — ENCOUNTER SYMPTOMS: BACK PAIN: 1

## 2025-03-06 NOTE — PROGRESS NOTES
Functionality Assessment/Goals Worksheet     On a scale of 0 (Does not Interfere) to 10 (Completely Interferes)     1.  Which number describes how during the past week pain has interfered with           the following:  A.  General Activity:  3  B.  Mood: 2  C.  Walking Ability:  2  D.  Normal Work (Includes both work outside the home and housework):  2  E.  Relations with Other People:   3  F.  Sleep:   2  G.  Enjoyment of Life:   2    2.  Patient Prefers to Take their Pain Medications:     [x]  On a regular basis   []  Only when necessary    []  Does not take pain medications    3.  What are the Patient's Goals/Expectations for Visiting Pain Management?     []  Learn about my pain    [x]  Receive Medication   []  Physical Therapy     []  Treat Depression   [x]  Receive Injections    []  Treat Sleep   []  Deal with Anxiety and Stress   []  Treat Opoid Dependence/Addiction   []  Other:                                
prior spine or ortho surgeon consult and with whom Yes Dr Rowland        Numerical Pain Scale  Pain rating  scale 1-10 highest  8  lowest  5  average   6    Radiology:    XR HIP due to  right hip SI  pain thigh pain stabbing pain in thigh   Narrative & Impression  PROCEDURE: XR HIP 3-4 VW W PELVIS BILATERAL     CLINICAL INFORMATION: SI (sacroiliac) joint inflammation (HCC), Bilateral hip pain, Bilateral hip pain.     COMPARISON: No prior study.     TECHNIQUE: AP and frog-leg lateral views were obtained  of the right and left hips.     FINDINGS:        There is no fracture or dislocation. The joint space is preserved.  No degenerative changes are noted. The superior and inferior pubic rami are normal. The sacroiliac joint is normal.  The soft tissues are normal.     IMPRESSION:  Normal right and left hip and sacroiliac joints..      Scoliosis XR    Narrative & Impression  PROCEDURE: Scoliosis series 3 views     CLINICAL INFORMATION: Scoliosis     TECHNIQUE: Standing AP, supine, and lateral views of the entire spine were  obtained.     FINDINGS: No abnormal lordotic or kyphotic curvatures are present. No  congenitally malformed vertebral bodies are seen. Scoliotic curvatures are  present as indicated in the table below:     T7-L1     levoscoliosis    upright 7 degrees      supine 3 degrees        L1-L4     dextroscoliosis    upright 8 degrees      supine 8 degrees        IMPRESSION:  Scoliotic curvatures as indicated above.      FINDINGS:     3 images obtained.     Cervical Vertebral:   Postsurgical changes are redemonstrated at the C3-C6 levels status post   laminoplasty. The vertebral column appears unchanged. Surgical drain and skin   staples have been removed.     Disc:   Disc spaces appear grossly stable.     Joint:   Facet joints appears stable.     FINDINGS:   MRI 2021  Straightening of the normal alignment. Vertebral bodies are within normal   limits in height and marrow signal.     Prevertebral and paraspinal

## 2025-03-06 NOTE — THERAPY DISCHARGE
Access Code: [email or web address]  []  No new Education completed  [x]  Reviewed Prior HEP      [x]  Patient verbalized and/or demonstrated understanding of education provided.  []  Patient unable to verbalize and/or demonstrate understanding of education provided.  Will continue education.  []  Barriers to learning:     PLAN:  Treatment Recommendations: Strengthening, Range of Motion, Balance Training, Gait Training, Stair Training, Neuromuscular Re-education, Manual Therapy - Soft Tissue Mobilization, Manual Therapy - Joint Manipulation, Pain Management, Home Exercise Program, Patient Education, Integrative Dry Needling, Aquatics, and Modalities    []  Plan of care initiated.  Plan to see patient 2 times per week for 8 weeks to address the treatment planned outlined above.  []  Continue with current plan of care  []  Modify plan of care as follows:    []  Hold pending physician visit  [x]  Discharge    Time In 0934   Time Out 1000   Timed Code Minutes: 26 min   Total Treatment Time: 26 min       Electronically Signed by: Farzana Abreu PT

## 2025-03-10 ENCOUNTER — TELEPHONE (OUTPATIENT)
Dept: PHYSICAL MEDICINE AND REHAB | Age: 40
End: 2025-03-10

## 2025-04-03 ENCOUNTER — TELEPHONE (OUTPATIENT)
Dept: PHYSICAL MEDICINE AND REHAB | Age: 40
End: 2025-04-03

## 2025-04-03 NOTE — TELEPHONE ENCOUNTER
Patient called and wanted to schedule an appointment with Dr. Pace.You  ordered procedures in December for an Advance SI Injection. I wanted to make sure this is what he needs to be scheduled for before proceeding.

## 2025-04-08 ENCOUNTER — OFFICE VISIT (OUTPATIENT)
Dept: PHYSICAL MEDICINE AND REHAB | Age: 40
End: 2025-04-08
Payer: COMMERCIAL

## 2025-04-08 VITALS
SYSTOLIC BLOOD PRESSURE: 124 MMHG | WEIGHT: 315 LBS | BODY MASS INDEX: 38.36 KG/M2 | HEIGHT: 76 IN | DIASTOLIC BLOOD PRESSURE: 82 MMHG

## 2025-04-08 DIAGNOSIS — M54.81 BILATERAL OCCIPITAL NEURALGIA: Primary | ICD-10-CM

## 2025-04-08 PROCEDURE — 64405 NJX AA&/STRD GR OCPL NRV: CPT | Performed by: PAIN MEDICINE

## 2025-04-08 RX ORDER — TRIAMCINOLONE ACETONIDE 40 MG/ML
40 INJECTION, SUSPENSION INTRA-ARTICULAR; INTRAMUSCULAR ONCE
Status: COMPLETED | OUTPATIENT
Start: 2025-04-08 | End: 2025-04-08

## 2025-04-08 RX ADMIN — TRIAMCINOLONE ACETONIDE 40 MG: 40 INJECTION, SUSPENSION INTRA-ARTICULAR; INTRAMUSCULAR at 12:02

## 2025-04-08 NOTE — PROGRESS NOTES
Functionality Assessment/Goals Worksheet     On a scale of 0 (Does not Interfere) to 10 (Completely Interferes)     1.  Which number describes how during the past week pain has interfered with           the following:  A.  General Activity:  8  B.  Mood: 4  C.  Walking Ability:  9  D.  Normal Work (Includes both work outside the home and housework):  8  E.  Relations with Other People:   6  F.  Sleep:   2  G.  Enjoyment of Life:   8    2.  Patient Prefers to Take their Pain Medications:     [x]  On a regular basis   []  Only when necessary    []  Does not take pain medications    3.  What are the Patient's Goals/Expectations for Visiting Pain Management?     []  Learn about my pain    []  Receive Medication   []  Physical Therapy     []  Treat Depression   [x]  Receive Injections    []  Treat Sleep   []  Deal with Anxiety and Stress   []  Treat Opoid Dependence/Addiction   []  Other:

## 2025-04-08 NOTE — PROGRESS NOTES
Procedure  Visit Date: 4/8/25    Wyatt Gallardo is a 40 y.o. male presents today in the office for the following:      History of Present Illness   Wyatt is here today for occipital nerve block.    Pre-Op Diagnosis   Bilateral Occipital neuralgia    Post-Op Diagnosis   Bilateral Occipital neuralgia    Procedure Documentation   The patient is positioned and landmarks identified. The occipital artery is palpated. Site was sprayed with Ethyl Chloride. Skin over the area was prepped with chloro-prep.  #25-gauge 1-1/2 inch hypodermic needle was inserted through the skin so that the tip of the needle is over the bone medial to the artery. Then a mixture of 4 cc of 0.25% Marcaine with 40 mg Kenalog was injected medial to the artery after negative aspiration.  This was done over the Bilateral Occipital nerve.    Vitals:    04/08/25 1114   BP: 128/78   BP Site: Left Upper Arm   Patient Position: Sitting   Weight: (!) 154.2 kg (339 lb 15.2 oz)   Height: 1.93 m (6' 3.98\")       Conclusion   The patient tolerated the procedure well and with out complication Patient's vital signs remained stable and was discharged home in stable condition. Patient will follow up in office.    No orders of the defined types were placed in this encounter.      Electronically signed by Rajan Leonard MD on 4/8/25 at 11:45 AM EDT

## 2025-04-29 ENCOUNTER — TELEPHONE (OUTPATIENT)
Dept: PHYSICAL MEDICINE AND REHAB | Age: 40
End: 2025-04-29

## 2025-04-29 NOTE — DISCHARGE INSTRUCTIONS
ANESTHESIA INSTRUCTIONS FOLLOWING SURGERY        Since you may experience some intermittent light-headedness for the next several hours, we suggest you plan on bed rest or quiet relaxation this evening. You must have a friend or relative stay with you tonight.    Because of the sedation you have received, it is recommended that you do not drive a motor vehicle, operate any kind of machinery, or sign any contractual agreement for 24 hours following the procedure.    You should not take alcoholic beverages tonight and only take sleeping medication that has been specifically prescribed for you by your physician.  Call office [phone] if you have:  Temperature greater than 100.4  Persistent nausea and vomiting  Severe uncontrolled pain  Redness, tenderness, or signs of infection (pain, swelling, redness, odor or green/yellow discharge around the site)  Difficulty breathing, headache or visual disturbances  Hives  Persistent dizziness or light-headedness  Extreme fatigue  Any other questions or concerns you may have after discharge    In an emergency, call 911 or go to an Emergency Department at a nearby hospital    It is important to bring a complete, current list of your medications to any medical appointments or hospitalizations.    REMINDER:   Carry a list of your medications and allergies with you at all times  Call your pharmacy at least 1 week in advance to refill prescriptions    Diet: Resume your usual diet. Good nutrition promotes healing. Increase fluid intake.     Activity: Rest for 24 hrs then resume normal activity.    HOME MEDICATIONS:      If on blood thinning products such as; Aspirin, NSAIDS, Plavix, Coumadin, Xarelto, Fish Oil, Multi-Vitamins or Herbal Supplements restart in 24 hours      Restart Metformin in 48 hours if you had procedure with dye.      Restart Metformin in 24 hours if no dye used during procedure.        Education Materials Received: {yes/no:204260}  Belongings Returned:  I had a cough and chills

## 2025-04-29 NOTE — TELEPHONE ENCOUNTER
Tried to call patient to confirm his procedure for 04/30/25 with Dr. Pace but no answer and not able to leave a message. We did have to move his procedure time. It was at 11:25am and we had to move it to 12:05pm. He will need to be here at 11:05am.

## 2025-05-15 ENCOUNTER — OFFICE VISIT (OUTPATIENT)
Dept: PHYSICAL MEDICINE AND REHAB | Age: 40
End: 2025-05-15
Payer: COMMERCIAL

## 2025-05-15 VITALS
WEIGHT: 315 LBS | HEIGHT: 76 IN | SYSTOLIC BLOOD PRESSURE: 130 MMHG | BODY MASS INDEX: 38.36 KG/M2 | DIASTOLIC BLOOD PRESSURE: 80 MMHG

## 2025-05-15 DIAGNOSIS — M54.81 BILATERAL OCCIPITAL NEURALGIA: ICD-10-CM

## 2025-05-15 DIAGNOSIS — M53.3 SACROILIAC JOINT DYSFUNCTION: ICD-10-CM

## 2025-05-15 DIAGNOSIS — G62.9 NEUROPATHY: ICD-10-CM

## 2025-05-15 DIAGNOSIS — G89.4 CHRONIC PAIN SYNDROME: ICD-10-CM

## 2025-05-15 DIAGNOSIS — M79.18 MYOFASCIAL PAIN SYNDROME: ICD-10-CM

## 2025-05-15 DIAGNOSIS — M54.17 LUMBOSACRAL RADICULITIS: ICD-10-CM

## 2025-05-15 DIAGNOSIS — M46.1 SI (SACROILIAC) JOINT INFLAMMATION: Primary | ICD-10-CM

## 2025-05-15 DIAGNOSIS — M47.812 CERVICAL SPONDYLOSIS: ICD-10-CM

## 2025-05-15 PROCEDURE — 99214 OFFICE O/P EST MOD 30 MIN: CPT | Performed by: NURSE PRACTITIONER

## 2025-05-15 RX ORDER — BACLOFEN 5 MG/1
10 TABLET ORAL DAILY PRN
Qty: 90 TABLET | Refills: 0 | Status: SHIPPED | OUTPATIENT
Start: 2025-05-15 | End: 2025-08-13

## 2025-05-15 ASSESSMENT — ENCOUNTER SYMPTOMS: BACK PAIN: 1

## 2025-05-15 NOTE — PROGRESS NOTES
Functionality Assessment/Goals Worksheet     On a scale of 0 (Does not Interfere) to 10 (Completely Interferes)     1.  Which number describes how during the past week pain has interfered with           the following:  A.  General Activity:  5  B.  Mood: 6  C.  Walking Ability:  5  D.  Normal Work (Includes both work outside the home and housework):  5  E.  Relations with Other People:   7  F.  Sleep:   3  G.  Enjoyment of Life:   6    2.  Patient Prefers to Take their Pain Medications:     [x]  On a regular basis   []  Only when necessary    []  Does not take pain medications    3.  What are the Patient's Goals/Expectations for Visiting Pain Management?     []  Learn about my pain    [x]  Receive Medication   []  Physical Therapy     []  Treat Depression   [x]  Receive Injections    []  Treat Sleep   []  Deal with Anxiety and Stress   []  Treat Opoid Dependence/Addiction   []  Other:

## 2025-05-15 NOTE — PROGRESS NOTES
Functionality Assessment/Goals Worksheet     On a scale of 0 (Does not Interfere) to 10 (Completely Interferes)     1.  Which number describes how during the past week pain has interfered with           the following:  A.  General Activity:  5  B.  Mood: 6  C.  Walking Ability:  5  D.  Normal Work (Includes both work outside the home and housework):  5  E.  Relations with Other People:   8  F.  Sleep:   5  G.  Enjoyment of Life:   7    2.  Patient Prefers to Take their Pain Medications:     [x]  On a regular basis   []  Only when necessary    []  Does not take pain medications    3.  What are the Patient's Goals/Expectations for Visiting Pain Management?     []  Learn about my pain    [x]  Receive Medication   []  Physical Therapy     []  Treat Depression   [x]  Receive Injections    []  Treat Sleep   []  Deal with Anxiety and Stress   []  Treat Opoid Dependence/Addiction   []  Other:     HPI:     ChiefComplaint: Lumbar back pain, cervical pain , SI pain, and Fibromyalgia Myofascial       F/U :  [facility] 4/8/2025  with Dr Pace states not much relief this time, maybe 25-50%     Started on Oxempic has lost 15 lbs.    Having increased Right  SI pain, increased pain with walking standing  bending. Scheduled for repeat right SI injection 6/11/2025     States his hip region is doing well   Right SI  hip thigh buttock pain starting to comeback  with walking with too big of a stride or when goes to stand. He has limited ROM to right hip. Feels like it is loose out of place  always has chiropractor adjusting.  He has been going to chiropractor  monthly and  continues water therapy 3 times per week. Visits Chiropractor for Ribs hips cervical issues      C/O neck pain stiffness spasms radiates into top of shoulders   Got a hot tub using it helping. Uses cold plunge also Also visits [facility] for water therapy 2-3 times per week,.     His Psych Dr is possibly testing for Autism.  Follows Psych for MDD    Right hip MRI and was referred

## 2025-05-29 RX ORDER — BACLOFEN 5 MG/1
TABLET ORAL
Refills: 0 | OUTPATIENT
Start: 2025-05-29

## 2025-06-10 ENCOUNTER — TELEPHONE (OUTPATIENT)
Dept: PHYSICAL MEDICINE AND REHAB | Age: 40
End: 2025-06-10

## 2025-06-11 ENCOUNTER — HOSPITAL ENCOUNTER (OUTPATIENT)
Age: 40
Setting detail: OUTPATIENT SURGERY
Discharge: HOME | End: 2025-06-11
Attending: PAIN MEDICINE | Admitting: PAIN MEDICINE
Payer: COMMERCIAL

## 2025-06-11 ENCOUNTER — APPOINTMENT (OUTPATIENT)
Dept: GENERAL RADIOLOGY | Age: 40
End: 2025-06-11
Attending: PAIN MEDICINE
Payer: COMMERCIAL

## 2025-06-11 VITALS
SYSTOLIC BLOOD PRESSURE: 128 MMHG | BODY MASS INDEX: 37.19 KG/M2 | OXYGEN SATURATION: 95 % | TEMPERATURE: 98.6 F | DIASTOLIC BLOOD PRESSURE: 73 MMHG | WEIGHT: 315 LBS | HEIGHT: 77 IN | HEART RATE: 63 BPM | RESPIRATION RATE: 16 BRPM

## 2025-06-11 LAB — GLUCOSE BLD STRIP.AUTO-MCNC: 113 MG/DL (ref 70–108)

## 2025-06-11 PROCEDURE — 2709999900 HC NON-CHARGEABLE SUPPLY: Performed by: PAIN MEDICINE

## 2025-06-11 PROCEDURE — 64451 NJX AA&/STRD NRV NRVTG SI JT: CPT | Performed by: PAIN MEDICINE

## 2025-06-11 PROCEDURE — 3600000054 HC PAIN LEVEL 3 BASE: Performed by: PAIN MEDICINE

## 2025-06-11 PROCEDURE — 99152 MOD SED SAME PHYS/QHP 5/>YRS: CPT | Performed by: PAIN MEDICINE

## 2025-06-11 PROCEDURE — 7100000010 HC PHASE II RECOVERY - FIRST 15 MIN: Performed by: PAIN MEDICINE

## 2025-06-11 PROCEDURE — 82948 REAGENT STRIP/BLOOD GLUCOSE: CPT

## 2025-06-11 PROCEDURE — 7100000011 HC PHASE II RECOVERY - ADDTL 15 MIN: Performed by: PAIN MEDICINE

## 2025-06-11 PROCEDURE — 6360000002 HC RX W HCPCS: Performed by: PAIN MEDICINE

## 2025-06-11 RX ORDER — METHYLPREDNISOLONE ACETATE 80 MG/ML
INJECTION, SUSPENSION INTRA-ARTICULAR; INTRALESIONAL; INTRAMUSCULAR; SOFT TISSUE PRN
Status: DISCONTINUED | OUTPATIENT
Start: 2025-06-11 | End: 2025-06-11 | Stop reason: ALTCHOICE

## 2025-06-11 RX ORDER — FENTANYL CITRATE 50 UG/ML
INJECTION, SOLUTION INTRAMUSCULAR; INTRAVENOUS PRN
Status: DISCONTINUED | OUTPATIENT
Start: 2025-06-11 | End: 2025-06-11 | Stop reason: ALTCHOICE

## 2025-06-11 RX ORDER — LIDOCAINE HYDROCHLORIDE 20 MG/ML
INJECTION, SOLUTION INFILTRATION; PERINEURAL PRN
Status: DISCONTINUED | OUTPATIENT
Start: 2025-06-11 | End: 2025-06-11 | Stop reason: ALTCHOICE

## 2025-06-11 RX ORDER — BUPIVACAINE HYDROCHLORIDE 2.5 MG/ML
INJECTION, SOLUTION EPIDURAL; INFILTRATION; INTRACAUDAL; PERINEURAL PRN
Status: DISCONTINUED | OUTPATIENT
Start: 2025-06-11 | End: 2025-06-11 | Stop reason: ALTCHOICE

## 2025-06-11 RX ORDER — MIDAZOLAM HYDROCHLORIDE 1 MG/ML
INJECTION, SOLUTION INTRAMUSCULAR; INTRAVENOUS PRN
Status: DISCONTINUED | OUTPATIENT
Start: 2025-06-11 | End: 2025-06-11 | Stop reason: ALTCHOICE

## 2025-06-11 ASSESSMENT — PAIN SCALES - GENERAL: PAINLEVEL_OUTOF10: 5

## 2025-06-11 ASSESSMENT — PAIN DESCRIPTION - ORIENTATION: ORIENTATION: RIGHT;LOWER

## 2025-06-11 ASSESSMENT — PAIN - FUNCTIONAL ASSESSMENT: PAIN_FUNCTIONAL_ASSESSMENT: 0-10

## 2025-06-11 ASSESSMENT — PAIN DESCRIPTION - LOCATION: LOCATION: BACK

## 2025-06-11 NOTE — H&P
1. SI (sacroiliac) joint inflammation    2. Sacroiliac joint dysfunction    3. Lumbosacral radiculitis    4. Bilateral occipital neuralgia    5. Cervical spondylosis    6. Myofascial pain syndrome    7. Neuropathy    8. Chronic pain syndrome                                     Plan:      Testing, Labs or Radiology Reviewed: Cervical shoulder Thoracic hip Scoliosis XR  Procedures: will plan repeat Cervical RFA at next f/u   Has right Si injection scheduled for 6/11/2025   discussed repeat [facility] hip steroid injection repeating Cervical RFA  Discussed with patient about risks with procedure including infection, reaction to medication, increased pain, or bleeding.  Medications:I will take over Trazodone but lower to 25 mg . Continue Cymbalta 60   Lyrica 50 mg  TID  uses medical marijuana Mobic  Baclofen 5 mg daily PRN spasms  trial   If patient is on blood thinners will need approval to hold: yes or no:No  Does patient have implanted device Stimulator, AICD or Pacemaker etc? NO  Referral to Dr Luke to rule out Thoracic Outlet Syndrome consult reviewed  FCE reviewed  March 27,2024   RIGHT HIP MRI reviewed and I already referred to [facility] Dr Becker  had consult 1/2025   Reviewed Dr Becker notes   Meds. Prescribed:

## 2025-06-11 NOTE — PRE SEDATION
[facility]  Pre-Sedation/Analgesia History & Physical    Pt Name: Wyatt Gallardo  MRN: [id number]  YOB: 1985  Provider Performing Procedure: Rajan Leonard MD   Primary Care Physician: Jordyn Henley MD    PRE-PROCEDURE   DNR-CCA/DNR-CC : No  Brief History/Pre-Procedure Diagnosis:    1. SI (sacroiliac) joint inflammation    2. Sacroiliac joint dysfunction       MEDICAL HISTORY       has a past medical history of Diabetes mellitus (HCC), Hypertension, and Insomnia.  SURGICAL HISTORY   has a past surgical history that includes FL SHOULDER ARTHROGRAM RIGHT S&I (Left, 2005); Carpal tunnel release (Bilateral, 2020); Cervical disc surgery (2021); Tonsillectomy and adenoidectomy (1988); Facet joint injection (Bilateral, 1/25/2024); Facet joint injection (Bilateral, 3/6/2024); Pain management procedure (Bilateral, 4/3/2024); Back Injection (Right, 5/14/2024); Nerve Block (Right, 6/18/2024); and Pain management procedure (Right, 2/13/2025).    ALLERGIES   Allergies as of 04/03/2025 - Fully Reviewed 03/06/2025   Allergen Reaction Noted    Pcn [penicillins] Rash 01/10/2024       MEDICATIONS   Coumadin Use Last 7 Days: No  Antiplatelet drug therapy use last 7 days: No  Other anticoagulant use last 7 days:  No  Prior to Admission medications    Medication Sig Start Date End Date Taking? Authorizing Provider   Baclofen (LIORESAL) 5 MG tablet Take 2 tablets by mouth daily as needed (spasms) 5/15/25 8/13/25 Yes Adelia Pérez APRN - CNP   Semaglutide (OZEMPIC, 0.25 OR 0.5 MG/DOSE, SC) Inject into the skin   Yes Provider, MD Guillermina   pregabalin (LYRICA) 50 MG capsule Take 1 capsule by mouth 3 times daily for 90 days. Max Daily Amount: 150 mg 3/6/25 6/11/25 Yes Adelia Pérez APRN - CNP   meloxicam (MOBIC) 15 MG tablet Take 1 tablet by mouth daily as needed for Pain 3/6/25 6/11/25 Yes Adelia Pérez APRN - CNP   buPROPion (WELLBUTRIN XL) 150 MG extended release tablet Take 1 tablet by

## 2025-06-11 NOTE — PROGRESS NOTES
0908: Pt arrived to Phase II recovery via cart. Awake and alert. Report received from NICHOLE Diallo.  0910: VSS. Pt denies pain. HOB elevated. Snack and drink provided. Call light placed in reach.  0925: VSS. IV removed. Pt dressed.  0930: Patient escorted to vehicle and discharged home in stable condition with mother.

## 2025-06-11 NOTE — PROCEDURES
Procedure Note:      Pre-operative Diagnosis:    1. SI (sacroiliac) joint inflammation    2. Sacroiliac joint dysfunction         Post-operative Diagnosis:  as above     Procedure: Right advanced SI joint block under fluoroscopy      Procedure Description:  After having signed the informed consent, the patient was placed in the prone position.  The patient's low back and buttocks was prepped with chloraprep solution, and draped in a sterile fashion. A total of 1 ml of 2% lidocaine was used to anesthetize the skin and underlying tissues at each level. Next, .5 inch 22 g spinal needles were advanced to the anatomic location of the L5 primary dorsal ramus at the junction of the superior articular process and sacral ala utilizing intermittent fluoroscopy, as well as the S1, S2, and S3 lateral branch nerves at the lateral border of the S1, S2, and S3 posterior/dorsal foramen. Then, a 1 cc mixture of 80 mg depo-medrol and 0.25% bupivacaine 4 cc was injected at each site. The needles were removed without any complications. The patient tolerated the procedure well, was transported to the recovery room and observed for 15 minutes and discharged in an ambulatory fashion. No immediate reported complications.     Procedural Complications: None  Estimated Blood Loss: 0 mL        IV sedation was used during the procedure:  - Moderate intravenous conscious sedation was supervised by Dr. Leonard  - The patient was independently monitored by a Registered Nurse assigned to the procedure room  - Monitoring included automated blood pressure, continuous EKG, and continuous pulse oximetry  - The detailed conscious record is permanently stored in the Hospital Information System  - The following is the conscious sedation record:  Start Time: 0856  End Time : 0907  Duration: 15 minutes   Medications Administered: 2 mg Versed, 100 mcg Fentanyl was given at   0858

## 2025-06-11 NOTE — POST SEDATION
[facility]– [place][note 2]  Sedation/Analgesia Post Sedation Record    Pt Name: Wyatt Gallardo  MRN: [id number]  YOB: 1985  Procedure Performed By: Rajan Leonard MD   Primary Care Physician: Jordyn Henley MD    POST-PROCEDURE    Physicians/Assistants: Rajan Leonard MD  Procedure Performed:  Right advanced SI joint block under fluoroscopy       Sedation/Anesthesia: Versed and Fentanyl (See procedure note for amount and duration)   Estimated Blood Loss:     0  ml  Specimens Removed:  None     Complications: None Immediately appreciated     Post-procedure Diagnosis/Findings:         1. SI (sacroiliac) joint inflammation    .2 Sacroiliac joint dysfunction            Recommendations:    F/u office           Electronically signed by Rajan Leonard MD on 6/11/2025 at 9:10 AM

## 2025-06-13 RX ORDER — BACLOFEN 5 MG/1
10 TABLET ORAL DAILY PRN
Qty: 90 TABLET | Refills: 0 | OUTPATIENT
Start: 2025-06-13 | End: 2025-09-11

## 2025-06-16 RX ORDER — BACLOFEN 5 MG/1
10 TABLET ORAL DAILY PRN
Qty: 60 TABLET | Refills: 0 | Status: SHIPPED | OUTPATIENT
Start: 2025-06-16 | End: 2025-07-16

## 2025-07-02 ENCOUNTER — TELEPHONE (OUTPATIENT)
Dept: PHYSICAL MEDICINE AND REHAB | Age: 40
End: 2025-07-02

## 2025-07-02 ENCOUNTER — OFFICE VISIT (OUTPATIENT)
Dept: PHYSICAL MEDICINE AND REHAB | Age: 40
End: 2025-07-02
Payer: COMMERCIAL

## 2025-07-02 VITALS
BODY MASS INDEX: 37.19 KG/M2 | DIASTOLIC BLOOD PRESSURE: 80 MMHG | HEART RATE: 84 BPM | HEIGHT: 77 IN | WEIGHT: 315 LBS | SYSTOLIC BLOOD PRESSURE: 132 MMHG

## 2025-07-02 DIAGNOSIS — M46.1 SI (SACROILIAC) JOINT INFLAMMATION: ICD-10-CM

## 2025-07-02 DIAGNOSIS — M47.816 LUMBAR SPONDYLOSIS: ICD-10-CM

## 2025-07-02 DIAGNOSIS — S73.191A TEAR OF RIGHT ACETABULAR LABRUM, INITIAL ENCOUNTER: ICD-10-CM

## 2025-07-02 DIAGNOSIS — M53.3 SACROILIAC JOINT DYSFUNCTION: ICD-10-CM

## 2025-07-02 DIAGNOSIS — M47.812 CERVICAL SPONDYLOSIS: Primary | ICD-10-CM

## 2025-07-02 DIAGNOSIS — M54.81 BILATERAL OCCIPITAL NEURALGIA: ICD-10-CM

## 2025-07-02 DIAGNOSIS — G89.4 CHRONIC PAIN SYNDROME: ICD-10-CM

## 2025-07-02 DIAGNOSIS — M54.17 LUMBOSACRAL RADICULITIS: ICD-10-CM

## 2025-07-02 DIAGNOSIS — M79.7 FIBROMYALGIA: ICD-10-CM

## 2025-07-02 DIAGNOSIS — G62.9 NEUROPATHY: ICD-10-CM

## 2025-07-02 DIAGNOSIS — M79.18 MYOFASCIAL PAIN SYNDROME: ICD-10-CM

## 2025-07-02 PROCEDURE — 99214 OFFICE O/P EST MOD 30 MIN: CPT | Performed by: NURSE PRACTITIONER

## 2025-07-02 RX ORDER — PREGABALIN 75 MG/1
75 CAPSULE ORAL 3 TIMES DAILY
Qty: 270 CAPSULE | Refills: 0 | Status: SHIPPED | OUTPATIENT
Start: 2025-07-02 | End: 2025-09-30

## 2025-07-02 ASSESSMENT — ENCOUNTER SYMPTOMS: BACK PAIN: 1

## 2025-07-02 NOTE — PROGRESS NOTES
Functionality Assessment/Goals Worksheet     On a scale of 0 (Does not Interfere) to 10 (Completely Interferes)     1.  Which number describes how during the past week pain has interfered with           the following:  A.  General Activity:  7  B.  Mood: 6  C.  Walking Ability:  6  D.  Normal Work (Includes both work outside the home and housework):  6  E.  Relations with Other People:   6  F.  Sleep:   5  G.  Enjoyment of Life:   5    2.  Patient Prefers to Take their Pain Medications:     [x]  On a regular basis   []  Only when necessary    []  Does not take pain medications    3.  What are the Patient's Goals/Expectations for Visiting Pain Management?     [x]  Learn about my pain    [x]  Receive Medication   [x]  Physical Therapy     []  Treat Depression   [x]  Receive Injections    []  Treat Sleep   [x]  Deal with Anxiety and Stress   []  Treat Opoid Dependence/Addiction   []  Other:

## 2025-07-02 NOTE — PROGRESS NOTES
Functionality Assessment/Goals Worksheet     On a scale of 0 (Does not Interfere) to 10 (Completely Interferes)     1.  Which number describes how during the past week pain has interfered with           the following:  A.  General Activity:  5  B.  Mood: 6  C.  Walking Ability:  5  D.  Normal Work (Includes both work outside the home and housework):  5  E.  Relations with Other People:   8  F.  Sleep:   5  G.  Enjoyment of Life:   7    2.  Patient Prefers to Take their Pain Medications:     [x]  On a regular basis   []  Only when necessary    []  Does not take pain medications    3.  What are the Patient's Goals/Expectations for Visiting Pain Management?     []  Learn about my pain    [x]  Receive Medication   []  Physical Therapy     []  Treat Depression   [x]  Receive Injections    []  Treat Sleep   []  Deal with Anxiety and Stress   []  Treat Opoid Dependence/Addiction   []  Other:     HPI:     ChiefComplaint: Lumbar back pain, cervical pain , SI pain, and Fibromyalgia Myofascial       F/U :  right Si injection scheduled for 6/11/2025  states he is receiving about 50% pain relief or benefit.     C/O neck pain stiffness spasms radiates into top of shoulders limited ROM, Has headaches mostly occipital region. When he has headaches and neck pain he has fatigue neck feels heavy.     Got a hot tub using it helping. Uses cold plunge also Also visits [facility] for water therapy 2-3 times per week.     He has tried Botox for migraines cervical dystonia at [facility] states no relief in past  Started on Oxempic has lost 25 lbs.    He had some Rheumatology screen labs were positive per GI Dr ordered. He  is pending Rheumatology referral. He thought he could  self referral. I told him the GI Dr Needs to refer. IGG IGA IGM  FEDE was elevated     States his hip region is doing well   Right SI  hip thigh buttock pain  with walking with too big of a stride or when goes to stand. He has limited ROM to right hip. Feels like it is loose out of

## 2025-07-02 NOTE — TELEPHONE ENCOUNTER
Patient denies taking blood thinners prior to procedure that is scheduled on 08/06/25 with Dr. Pace.

## 2025-07-21 ENCOUNTER — OFFICE VISIT (OUTPATIENT)
Age: 40
End: 2025-07-21
Payer: COMMERCIAL

## 2025-07-21 ENCOUNTER — HOSPITAL ENCOUNTER (OUTPATIENT)
Age: 40
Discharge: HOME | End: 2025-07-21

## 2025-07-21 VITALS
SYSTOLIC BLOOD PRESSURE: 154 MMHG | OXYGEN SATURATION: 97 % | WEIGHT: 315 LBS | HEART RATE: 87 BPM | HEIGHT: 77 IN | BODY MASS INDEX: 37.19 KG/M2 | DIASTOLIC BLOOD PRESSURE: 80 MMHG

## 2025-07-21 DIAGNOSIS — R76.8 POSITIVE ANA (ANTINUCLEAR ANTIBODY): ICD-10-CM

## 2025-07-21 DIAGNOSIS — R76.8 POSITIVE ANA (ANTINUCLEAR ANTIBODY): Primary | ICD-10-CM

## 2025-07-21 DIAGNOSIS — M79.7 FIBROMYALGIA: ICD-10-CM

## 2025-07-21 DIAGNOSIS — M25.50 MULTIPLE JOINT PAIN: ICD-10-CM

## 2025-07-21 PROCEDURE — 99204 OFFICE O/P NEW MOD 45 MIN: CPT | Performed by: NURSE PRACTITIONER

## 2025-07-21 RX ORDER — VITAMIN B COMPLEX
1 TABLET ORAL DAILY
COMMUNITY

## 2025-07-21 RX ORDER — LISINOPRIL 40 MG/1
40 TABLET ORAL DAILY
COMMUNITY
Start: 2025-06-26

## 2025-07-21 RX ORDER — BACLOFEN 5 MG/1
TABLET ORAL
COMMUNITY
Start: 2025-07-18

## 2025-07-21 ASSESSMENT — RHEUMATOLOGY NEW PATIENT QUESTIONNAIRE
HEARTBURN OR REFLUX: N
SEIZURES: Y
FEVER: N
SKIN TIGHTNESS: Y
LOSS OF VISION: N
DIFFICULTY STAYING ASLEEP: Y
EYE REDNESS: N
PERSISTENT DIARRHEA: Y
MEMORY LOSS: Y
BEHAVIORAL CHANGES: Y
COLOR CHANGES OF HANDS OR FEET IN THE COLD: Y
BLACK STOOLS: N
NODULES/BUMPS: N
DIFFICULTY SWALLOWING: N
COUGH: Y
DIFFICULTY FALLING ASLEEP: N
EYE DRYNESS: N
ANXIETY: Y
SWOLLEN LEGS OR FEET: Y
SHORTNESS OF BREATH: Y
EYE PAIN: Y
MORNING STIFFNESS: Y
EASY BRUISING: N
PAIN OR BURNING ON URINATION: N
NAUSEA: Y
FAINTING: Y
SUN SENSITIVE (SUN ALLERGY): Y
SORES IN MOUTH OR NOSE: Y
COUGHING OF BLOOD: N
DRYNESS OF MOUTH: N
ABNORMAL URINE: N
INCREASED SUSCEPTIBILITY TO INFECTION: Y
MORNING STIFFNESS IN LOWER BACK: Y
RASH OR ULCERS: N
AGITATION: Y
JOINT PAIN: Y
MUSCLE WEAKNESS: Y
CHEST PAIN: Y
UNUSUALLY RAPID OR SLOWED HEART RATE: Y
LOSS OF CONSCIOUSNESS: Y
SKIN REDNESS: Y
HOW WOULD YOU DESCRIBE YOUR STIFFNESS ON AVERAGE: SEVERE
RASH: Y
UNEXPLAINED HEARING LOSS: N
SWOLLEN OR TENDER GLANDS: Y
DEPRESSION: Y
JAUNDICE: N
EXCESSIVE HAIR LOSS (MORE THAN YOUR NORM): N
NUMBNESS OR TINGLING IN HANDS OR FEET: Y
WHEEZING: Y
DOUBLE OR BLURRED VISION: N
UNEXPLAINED WEIGHT CHANGE: N
UNUSUAL FATIGUE: Y
VOMITING OF BLOOD OR COFFEE GROUND CONSISTENCY MATERIAL: N
ANEMIA: N
HOARSE VOICE: N
NIGHT SWEATS: N
STOMACH PAIN: Y
BLOOD IN STOOLS: Y
UNUSUAL BLEEDING: N
JOINT SWELLING: Y
EASILY LOSING TEMPER: Y
HEADACHES: Y
DIFFICULTY BREATHING LYING DOWN: N

## 2025-07-21 ASSESSMENT — ENCOUNTER SYMPTOMS
BACK PAIN: 0
TROUBLE SWALLOWING: 0
SHORTNESS OF BREATH: 0
COLOR CHANGE: 0
BLOOD IN STOOL: 0
DIARRHEA: 0
EYE PAIN: 0
ABDOMINAL PAIN: 0

## 2025-07-21 ASSESSMENT — JOINT PAIN
TOTAL NUMBER OF SWOLLEN JOINTS: 0
TOTAL NUMBER OF TENDER JOINTS: 2

## 2025-07-21 NOTE — PROGRESS NOTES
[facility]   Rheumatology Clinic Note      7/21/2025       Reason for Consult:  +FEDE  Requesting Physician:  Jordyn Henley MD     CHIEF COMPLAINT:    Chief Complaint   Patient presents with    New Patient     Positive FEDE    Other     Patient seen a Rheumatologist SOLOMON Workman and was Dx with Fibro in 2021. Patient is seeing pain management. Possible has POTS mother was reviewing notes from 2023 [facility] based in [place]. No follow up was scheduled. Family H/O of Lupus.  Pain level 6  Generalized pain           HISTORY OF PRESENT ILLNESS:    40 y.o. male presents today for evaluation of +FEDE.    Patient has a history of chronic back pain, SI joint pain, and fibromyalgia for which he follows with pain management. He is on Lyrica, which was recently increased 07/02/25, Cymbalta, meloxicam, and recently he was started on baclofen. He has received injections in his back and SI joints. He has a history of laminoplasty of the cervical spine. He was seen by gastroenterology for elevated LFTs and during this work-up he was noted to have a positive FEDE. He reports bilateral shoulder pain that radiates down the arm. The pain is continuous and described as burning pain. Certain movements makes the pain worse. The pain is better with rest and his medications from pain management. He has bilateral leg pain that starts at the SI joint and radiates down. He has associated weakness and discoloration from the knee down. The pain is continuous and described as pressure. Taking longer strides or picking up his feet far like when climbing stairs makes the pain worse. Stretching, using the hot tub, and his medications help. He denies episodes of painful, swollen joints.    He gets skin rashes to the arms, back, legs, and chest that are red, bumpy and \"do not really itch.\" He has never seen dermatology & sometimes applies lotion.   No psoriasis  No oral ulcers  No unplanned weight loss  +photosensitivity  No

## 2025-07-27 ENCOUNTER — TRANSCRIBE ORDERS (OUTPATIENT)
Dept: ADMINISTRATIVE | Age: 40
End: 2025-07-27

## 2025-07-27 DIAGNOSIS — R42 LIGHT-HEADEDNESS: Primary | ICD-10-CM

## 2025-08-15 ENCOUNTER — HOSPITAL ENCOUNTER (OUTPATIENT)
Age: 40
Discharge: HOME | End: 2025-08-17
Payer: COMMERCIAL

## 2025-08-15 ENCOUNTER — PATIENT MESSAGE (OUTPATIENT)
Age: 40
End: 2025-08-15

## 2025-08-15 DIAGNOSIS — R42 LIGHT-HEADEDNESS: ICD-10-CM

## 2025-08-15 LAB
TILT CV INITIAL SUPINE HEART RATE: 61 BPM
TILT CV INITIAL SUPINE MAX BP: NORMAL BPM
TILT CV INITIAL TILT BLOOD PRESSURE: NORMAL MMHG
TILT CV INITIAL TILT HEART RATE: 73 BPM
TILT CV MAX BP BLOOD PRESSURE: NORMAL MMHG
TILT CV MAX BP HEART RATE: 77 BPM
TILT CV MAX BP MINUTES: 21
TILT CV MAX BP SECONDS: 0
TILT CV MAX HEART RATE: 86 BPM
TILT CV MAX HR BLOOD PRESSURE: NORMAL MMHG
TILT CV MAX HR MINUTES: 22
TILT CV MAX HR SECONDS: 0
TILT CV MINIMUM BP BLOOD PRESSURE: NORMAL MMHG
TILT CV MINIMUM BP HEART RATE: 74 BPM
TILT CV MINIMUM BP MINUTES: 2
TILT CV MINIMUM BP SECONDS: 0
TILT CV MINIMUM HR BP: NORMAL MMHG
TILT CV MINIMUM HR HEART RATE: 69 BPM
TILT CV MINIMUM HR MINUTES: 25
TILT CV MINIMUM HR SECONDS: 0

## 2025-08-15 PROCEDURE — 93660 TILT TABLE EVALUATION: CPT

## 2025-08-15 PROCEDURE — 93660 TILT TABLE EVALUATION: CPT | Performed by: INTERNAL MEDICINE

## (undated) DEVICE — 3 ML SYRINGE LUER-LOCK TIP: Brand: MONOJECT

## (undated) DEVICE — GLOVE ORANGE PI 7 1/2   MSG9075

## (undated) DEVICE — 6 ML SYRINGE LUER-LOCK TIP: Brand: MONOJECT

## (undated) DEVICE — 6 ML SYRINGE REGULAR TIP: Brand: MONOJECT

## (undated) DEVICE — TOWEL,OR,DSP,ST,BLUE,STD,4/PK,20PK/CS: Brand: MEDLINE

## (undated) DEVICE — GAUZE SPONGES,USP TYPE VII GAUZE, 12 PLY: Brand: CURITY

## (undated) DEVICE — SYRINGE MED 5ML STD CLR PLAS LUERLOCK TIP N CTRL DISP

## (undated) DEVICE — NEEDLE SPNL 22GA L3.5IN BLK HUB S STL REG WALL FIT STYL W/

## (undated) DEVICE — HYPODERMIC SAFETY NEEDLE: Brand: MAGELLAN

## (undated) DEVICE — GAUZE,SPONGE,4"X4",12PLY,STERILE,LF,2'S: Brand: MEDLINE

## (undated) DEVICE — TOWEL OR BLUEE 16X26IN ST 8 PACK ORB08 16X26ORTWL

## (undated) DEVICE — APPLICATOR MEDICATED 26 CC SOLUTION HI LT ORNG CHLORAPREP

## (undated) DEVICE — CHLORAPREP 26ML CLEAR

## (undated) DEVICE — NEEDLE SPNL 22GA L3.5IN BLK HUB S STL REG WALL FIT STYL

## (undated) DEVICE — NEEDLE SYR 18GA L1.5IN RED PLAS HUB S STL BLNT FILL W/O

## (undated) DEVICE — SHEET,DRAPE,3/4,53X77,STERILE: Brand: MEDLINE

## (undated) DEVICE — SYRINGE MED 5ML POLYPR GEN PURP FLAT TOP LUERLOCK TIP

## (undated) DEVICE — SYRINGE MED 10ML LUERLOCK TIP W/O SFTY DISP

## (undated) DEVICE — SYRINGE MED 30ML SLIP TIP BLNT FILL AND LUERLOCK DISP

## (undated) DEVICE — NEEDLE HYPO 25GA L1.5IN BVL ORIENTED ECLIPSE

## (undated) DEVICE — LABEL MED WHT OR W O INITIALS AND DATE SECT PRESOURCE

## (undated) DEVICE — NEEDLE SPNL 22GA L35IN PNCL PNT ATRAUM TIP PENCAN

## (undated) DEVICE — MARKER,SKIN,WI/RULER AND LABELS: Brand: MEDLINE

## (undated) DEVICE — GLOVE SURG SZ 65 THK91MIL LTX FREE SYN POLYISOPRENE

## (undated) DEVICE — GLOVE ORANGE PI 8 1/2   MSG9085

## (undated) DEVICE — SC PAIN PACK: Brand: MEDLINE INDUSTRIES, INC.

## (undated) DEVICE — NEEDLE SYRINGE 18GA L1.5IN RED PLAS HUB S STL BLNT FILL W/O

## (undated) DEVICE — NEEDLE SPNL 22GA L7IN BLK HUB S STL W/ QNCKE PNT W/OUT

## (undated) DEVICE — SYRINGE MED 3ML TRNSLUC BRL POLYPR GEN PURP W/ LUERLOCK TIP

## (undated) DEVICE — NEEDLE SPNL 22 GAX5 IN HUB QNCKE FUNNEL SHP STYL BLK SPINCAN

## (undated) DEVICE — SYRINGE MEDICAL 3ML CLEAR PLASTIC STANDARD NON CONTROL LUERLOCK TIP DISPOSABLE